# Patient Record
Sex: FEMALE | Race: WHITE | Employment: OTHER | URBAN - METROPOLITAN AREA
[De-identification: names, ages, dates, MRNs, and addresses within clinical notes are randomized per-mention and may not be internally consistent; named-entity substitution may affect disease eponyms.]

---

## 2017-03-06 ENCOUNTER — OFFICE VISIT (OUTPATIENT)
Dept: ORTHOPEDIC SURGERY | Age: 67
End: 2017-03-06

## 2017-03-06 VITALS
SYSTOLIC BLOOD PRESSURE: 172 MMHG | BODY MASS INDEX: 31.99 KG/M2 | WEIGHT: 192 LBS | DIASTOLIC BLOOD PRESSURE: 97 MMHG | HEIGHT: 65 IN

## 2017-03-06 DIAGNOSIS — M51.36 DDD (DEGENERATIVE DISC DISEASE), LUMBAR: ICD-10-CM

## 2017-03-06 DIAGNOSIS — M47.818 SI JOINT ARTHRITIS: ICD-10-CM

## 2017-03-06 DIAGNOSIS — M54.50 LUMBAR SPINE PAIN: Primary | ICD-10-CM

## 2017-03-06 DIAGNOSIS — M47.817 FACET ARTHRITIS OF LUMBOSACRAL REGION: ICD-10-CM

## 2017-03-06 PROCEDURE — 72100 X-RAY EXAM L-S SPINE 2/3 VWS: CPT | Performed by: PHYSICIAN ASSISTANT

## 2017-03-06 PROCEDURE — 99203 OFFICE O/P NEW LOW 30 MIN: CPT | Performed by: PHYSICIAN ASSISTANT

## 2017-03-06 RX ORDER — ATORVASTATIN CALCIUM 40 MG/1
TABLET, FILM COATED ORAL
Refills: 4 | COMMUNITY
Start: 2016-12-25

## 2017-03-06 RX ORDER — ATENOLOL 100 MG/1
TABLET ORAL
Refills: 1 | Status: ON HOLD | COMMUNITY
Start: 2016-12-27 | End: 2019-08-15 | Stop reason: HOSPADM

## 2017-03-06 RX ORDER — METHYLPREDNISOLONE 4 MG/1
TABLET ORAL
Qty: 1 KIT | Refills: 0 | Status: SHIPPED | OUTPATIENT
Start: 2017-03-06 | End: 2017-10-09

## 2017-03-06 RX ORDER — LOSARTAN POTASSIUM 50 MG/1
100 TABLET ORAL DAILY
Status: ON HOLD | COMMUNITY
Start: 2017-01-10 | End: 2019-08-15 | Stop reason: HOSPADM

## 2017-10-09 ENCOUNTER — OFFICE VISIT (OUTPATIENT)
Dept: ORTHOPEDIC SURGERY | Age: 67
End: 2017-10-09

## 2017-10-09 VITALS
SYSTOLIC BLOOD PRESSURE: 135 MMHG | BODY MASS INDEX: 31.65 KG/M2 | DIASTOLIC BLOOD PRESSURE: 82 MMHG | HEIGHT: 65 IN | HEART RATE: 90 BPM | WEIGHT: 190 LBS

## 2017-10-09 DIAGNOSIS — M25.552 LEFT HIP PAIN: Primary | ICD-10-CM

## 2017-10-09 DIAGNOSIS — S39.012A LUMBAR STRAIN, INITIAL ENCOUNTER: ICD-10-CM

## 2017-10-09 PROCEDURE — 73502 X-RAY EXAM HIP UNI 2-3 VIEWS: CPT | Performed by: ORTHOPAEDIC SURGERY

## 2017-10-09 PROCEDURE — 99214 OFFICE O/P EST MOD 30 MIN: CPT | Performed by: ORTHOPAEDIC SURGERY

## 2017-10-09 RX ORDER — METHYLPREDNISOLONE 4 MG/1
TABLET ORAL
Qty: 1 KIT | Refills: 0 | Status: SHIPPED | OUTPATIENT
Start: 2017-10-09 | End: 2019-01-08

## 2017-10-09 NOTE — PROGRESS NOTES
mouth every 8 hours as needed for Pain (Take with food), Disp: 120 tablet, Rfl: 0    lidocaine (LIDODERM) 5 %, Place 1 patch onto the skin daily 12 hours on, 12 hours off., Disp: 10 patch, Rfl: 0    atorvastatin (LIPITOR) 40 MG tablet, TAKE 1 TABLET BY MOUTH DAILY. , Disp: , Rfl: 4    atenolol (TENORMIN) 100 MG tablet, TAKE 1 TABLET BY MOUTH EVERY DAY, Disp: , Rfl: 1    losartan (COZAAR) 50 MG tablet, , Disp: , Rfl:   Allergies:  Sulphadimidine [sulfamethazine]  Social History:    reports that she has been smoking. She does not have any smokeless tobacco history on file. Family History:   Family History   Problem Relation Age of Onset    Osteoarthritis Mother     Cancer Paternal Grandmother        REVIEW OF SYSTEMS:   For new problems, a full review of systems will be found scanned in the patient's chart. CONSTITUTIONAL: Denies unexplained weight loss, fevers, chills   NEUROLOGICAL: Denies unsteady gait or progressive weakness  SKIN: Denies skin changes, delayed healing, rash, itching       PHYSICAL EXAM:    Vitals: Blood pressure 135/82, pulse 90, height 5' 5\" (1.651 m), weight 190 lb (86.2 kg). GENERAL EXAM:  · General Apparence: Patient is adequately groomed with no evidence of malnutrition. · Orientation: The patient is oriented to time, place and person. · Mood & Affect:The patient's mood and affect are appropriate       LEFT hip PHYSICAL EXAMINATION:  · Inspection:  No deformity ecchymosis or erythema    · Palpation:  There is tenderness in the LEFT SI joint and into the LEFT buttock over the area of the piriformis. No significant tenderness through the groin, no tenderness at the greater trochanter      · Range of Motion: Internal/external rotation of the bilateral hips is tolerated with no pain, negative straight leg raise bilaterally    · Strength: Hip flexor and abductor and adductor intact    · Special Tests:  Negative logroll testing bilateral hips.   The patient does have difficulty with balance on her LEFT lower extremity compared to the right secondary to pain            · Skin:  There are no rashes, ulcerations or lesions. · Gait & station:  favoring the LEFT leg      · Additional Examinations:        Right Lower Extremity: Examination of the right lower extremity does not show any tenderness, deformity or injury. Range of motion is unremarkable. There is no gross instability. There are no rashes, ulcerations or lesions. Strength and tone are normal.      Diagnostic Testing:      Views:  2   Location:  LEFT hip   Findings: Moderate to severe bilateral hip osteoarthritis but no evidence of acute abnormality    Orders     Orders Placed This Encounter   Procedures    XR HIP LEFT (2-3 VIEWS)     24087     Order Specific Question:   Reason for exam:     Answer:   Pain    [facility] PT [facility]     Referral Priority:   Routine     Referral Type:   Eval and Treat     Referral Reason:   Specialty Services Required     Requested Specialty:   Physical Therapy     Number of Visits Requested:   1         Assessment / Treatment Plan:     1. Bilateral hip osteoarthritis    2. LEFT lumbar strain with piriformis syndrome    At this time, although the patient has osteoarthritis of her hips, it does not seem to be symptomatic at this time. Her symptoms seem to be more lumbar in etiology. I recommended a Medrol Dosepak for symptomatic relief, activity modification at work and home exercises. I will also give her a referral to formal PT and she will check with her insurance and coverage. If her symptoms persist over the next 3-4 weeks, she will likely need to follow up with the spine team.      I have personally performed and/or participated in the history, exam and medical decision making and agree with all pertinent clinical information. I have also reviewed and agree with the past medical, family and social history unless otherwise noted.        This dictation was performed with a verbal recognition program (DRAGON) and it was checked for errors. It is possible that there are still dictated errors within this office note. If so, please bring any errors to my attention for an addendum. All efforts were made to ensure that this office note is accurate.           Pierre Zhang MD

## 2017-10-09 NOTE — LETTER
[place] [address]  Phone: [phone]  Fax: [phone]    Jody Rodriguez MD        October 9, 2017     Patient: Don Faustin   YOB: 1950   Date of Visit: 10/9/2017       To Whom It May Concern: It is my medical opinion that Mikie Malik should not participate in any heavy lifting or bending forward to lift any objects off the floor due to a recent orthopedic injury. These restrictions will be in effect for the next 2 weeks through 10/23/2017. If you have any questions or concerns, please don't hesitate to call.     Sincerely,        Jody Rodriguez MD

## 2017-10-20 ENCOUNTER — HOSPITAL ENCOUNTER (OUTPATIENT)
Dept: PHYSICAL THERAPY | Age: 67
Discharge: HOME | End: 2017-10-31
Admitting: ORTHOPAEDIC SURGERY

## 2017-10-30 ENCOUNTER — HOSPITAL ENCOUNTER (OUTPATIENT)
Dept: PHYSICAL THERAPY | Age: 67
Discharge: HOME | End: 2017-10-30
Admitting: ORTHOPAEDIC SURGERY

## 2017-10-30 NOTE — PLAN OF CARE
[place] [facility] [facility] [facility], [address]  Phone: [phone]  Fax [phone]    Physical Therapy Certification    Dear Referring Practitioner: Dr Allie Rodriguez,    We had the pleasure of evaluating the following patient for physical therapy services at [address] [place] [address]. A summary of our findings can be found in the initial assessment below. This includes our plan of care. If you have any questions or concerns regarding these findings, please do not hesitate to contact me at the office phone number checked above. Thank you for the referral.       Physician Signature:_______________________________Date:__________________  By signing above (or electronic signature), therapists plan is approved by physician    Patient: Wander Mckenna   : 1950   MRN: [id number]  Referring Physician: Referring Practitioner: Dr Allie Rodriguez      Evaluation Date: 10/30/2017      Medical Diagnosis Information:  Diagnosis: W47.183V (ICD-10-CM) - Lumbar strain, initial encounter/ Piriformis Syndrome   Treatment Diagnosis: Lumbar strain S39.012A                                         Insurance information: PT Insurance Information: MC/Humana     Precautions/ Contra-indications: DDD Lumbar spine, OA B knees, Anxiety/Depression  Latex Allergy:  [x]NO      []YES  Preferred Language for Healthcare:   [x]English       []other:    SUBJECTIVE: Patient stated complaint of of L buttock pain that has now improved to the point where she no longer has any pain. She is here to prevent the re-occurrence of pain. The original injury was  when she went to bend over and feed a dog. Since then she has been off work as a  on order to avoid bending and lifting. Relevant Medical History: Anxiety/Depression, OA B knees    Functional Disability Index/G-Codes:  PT G-Codes  Functional Assessment Tool Used:  Mod Oswestry  Score: 6%  Functional Limitation: Changing and maintaining body position  Changing and Maintaining Body Position Current Status (): At least 1 percent but less than 20 percent impaired, limited or restricted  Changing and Maintaining Body Position Goal Status (): At least 1 percent but less than 20 percent impaired, limited or restricted6%    Pain Scale:0/10  Easing factors: Cold  Provocative factors:lifting    Type: []Constant   []Intermittent  []Radiating []Localized [x]other:None currently   Numbness/Tingling: None    Occupation/School: [employer]/  Living Status/Prior Level of Function: Independent with ADLs and IADLs, Pt was able to work full time and was painfree    OBJECTIVE:     ROM     LUMBAR FLEX WNL    LUMBAR EXT 10    Sidebend WNL    Rotation WNL    STRENGTH LEFT RIGHT   HIP Flex 5 5   HIP Abd 5 5   HIP ER 5 5   HIP IR 5 5   Knee Flex 5 5   Knee ext 5 5   Hamstring FLEX - -   Piriformis  - +                                                                           Reflexes/Sensation:    [x]Dermatomes/Myotomes intact    []UE Reflexes     []Normal []Hypo      []Hyper   []LE Reflexes     []Normal []Hypo      []Hyper   []Babinski/Clonus/Hoffmans:    []Other:    Joint mobility: Lumbar   []Normal    [x]Hypo   []Hyper    Palpation:None today/ Pt reported R buttock    Functional Mobility/Transfers: Independent transfers  Posture: Decreased Lumbar Lordosis  Bandages/Dressings/Incisions: None  Gait: (include devices/WB status)     Orthopedic Special Tests: -SLR, - Slump Test                       [x] Patient history, allergies, meds reviewed. Medical chart reviewed. See intake form. Review Of Systems (ROS):  [x]Performed Review of systems (Integumentary, CardioPulmonary, Neurological) by intake and observation. Intake form has been scanned into medical record. Patient has been instructed to contact their primary care physician regarding ROS issues if not already being addressed at this time. Co-morbidities/Complexities (which will affect course of rehabilitation):   []None           Arthritic conditions   []Rheumatoid arthritis (M05.9)  []Osteoarthritis (M19.91)   Cardiovascular conditions   []Hypertension (I10)  []Hyperlipidemia (E78.5)  []Angina pectoris (I20)  []Atherosclerosis (I70)   Musculoskeletal conditions   []Disc pathology   []Congenital spine pathologies   []Prior surgical intervention  []Osteoporosis (M81.8)  []Osteopenia (M85.8)   Endocrine conditions   []Hypothyroid (E03.9)  []Hyperthyroid Gastrointestinal conditions   []Constipation (K13.91)   Metabolic conditions   []Morbid obesity (E66.01)  []Diabetes type 1(E10.65) or 2 (E11.65)   []Neuropathy (G60.9)     Pulmonary conditions   []Asthma (J45)  []Coughing   []COPD (J44.9)   Psychological Disorders  []Anxiety (F41.9)  []Depression (F32.9)   []Other:   [x]Other: Occupation         Barriers to/and or personal factors that will affect rehab potential:              []Age  []Sex              []Motivation/Lack of Motivation                        []Co-Morbidities              []Cognitive Function, education/learning barriers              []Environmental, home barriers              [x]profession/work barriers  []past PT/medical experience  []other:  Justification: Lifting    Falls Risk Assessment (30 days):   [x] Falls Risk assessed and no intervention required. [] Falls Risk assessed and Patient requires intervention due to being higher risk   TUG score (>12s at risk):     [] Falls education provided, including       G-Codes:  PT G-Codes  Functional Assessment Tool Used: Mod Oswestry  Score: 6%  Functional Limitation: Changing and maintaining body position  Changing and Maintaining Body Position Current Status (): At least 1 percent but less than 20 percent impaired, limited or restricted  Changing and Maintaining Body Position Goal Status ():  At least 1 percent but less than 20 percent impaired, limited or dysfunction   []Signs/symptoms consistent with lumbar stenosis type dysfunction   []Signs/symptoms consistent with nerve root involvement including myotome & dermatome dysfunction   []Signs/symptoms consistent with post-surgical status including: decreased ROM, strength and function. []signs/symptoms consistent with pathology which may benefit from Dry needling     []other:      Prognosis/Rehab Potential:      []Excellent   [x]Good    []Fair   []Poor    Tolerance of evaluation/treatment:    []Excellent   [x]Good    []Fair   []Poor  Physical Therapy Evaluation Complexity Justification  [x] A history of present problem with:  [] no personal factors and/or comorbidities that impact the plan of care;  [x]1-2 personal factors and/or comorbidities that impact the plan of care  []3 personal factors and/or comorbidities that impact the plan of care  [x] An examination of body systems using standardized tests and measures addressing any of the following: body structures and functions (impairments), activity limitations, and/or participation restrictions;:  [x] a total of 1-2 or more elements   [] a total of 3 or more elements   [] a total of 4 or more elements   [x] A clinical presentation with:  [x] stable and/or uncomplicated characteristics   [] evolving clinical presentation with changing characteristics  [] unstable and unpredictable characteristics;   [x] Clinical decision making of [x] low, [] moderate, [] high complexity using standardized patient assessment instrument and/or measurable assessment of functional outcome.     [x] EVAL (LOW) 68522 (typically 20 minutes face-to-face)  [] EVAL (MOD) 33074 (typically 30 minutes face-to-face)  [] EVAL (HIGH) 06724 (typically 45 minutes face-to-face)  [] RE-EVAL         PLAN: Begin PT focusing on: proximal hip mobilizations, LB mobs, LB core activation, proximal hip activation, and HEP    Frequency/Duration: 1-2 days per week for 4-6 Weeks:  Interventions:  [x]  Therapeutic exercise including: strength training, ROM, for LE, Glutes and core   [x]  NMR activation and proprioception for glutes , LE and Core   [x]  Manual therapy as indicated for Hip complex, LE and spine to include: Dry Needling/IASTM, STM, PROM, Gr I-IV mobilizations, manipulation. [x]  Modalities as needed that may include: thermal agents, E-stim, Biofeedback, US, iontophoresis as indicated  [x]  Patient education on joint protection, postural re-education, activity modification, progression of HEP. HEP instruction(see scanned forms)    GOALS:  Patient stated goal: Lifting from the floor, carrying heavy [place] for work and prevention of re-occurrence    Therapist goals for Patient:   Short Term Goals: To be achieved in: 2 weeks  1. Independent in HEP and progression per patient tolerance, in order to prevent re-injury. 2. Patient will have a decrease in pain to facilitate improvement in movement, function, and ADLs as indicated by Functional Deficits. Long Term Goals: To be achieved in: 4-6 weeks  1. Disability index score of 3% or less for the Mod Oswestry  to assist with reaching prior level of function. 2. Patient will demonstrate increased AROM to WNL, good LS mobility, good hip ROM to allow for proper joint functioning as indicated by patients Functional Deficits. 3. Patient will demonstrate an increase in proper body mechanics instruction and proper use of a Lumbar Roll  4. Patient will return to Lifting at work without exacerbating symptoms functional activities without increased symptoms or restriction.    5. Lifting properly from the floor and carrying heavy [place] on a tray without pain(patient specific functional goal)         Electronically signed by:  Juliette Hough PT

## 2017-10-31 NOTE — FLOWSHEET NOTE
[place] [facility] [facility],  [address]  Phone: [phone]  Fax [phone]    Physical Therapy Daily Treatment Note  Date:  10/30/2017    Patient Name:  Rylan Cavazos    :  1950  MRN: [id number]  Restrictions/Precautions:    Medical/Treatment Diagnosis Information:  · Diagnosis: U71.698F (ICD-10-CM) - Lumbar strain, initial encounter  · Treatment Diagnosis: Lumbar strain U80.000M  Insurance/Certification information:  PT Insurance Information: /Humana  Physician Information:  Referring Practitioner: Dr Karla Jacques of care signed (Y/N):     Date of Patient follow up with Physician:     G-Code (if applicable):      Date G-Code Applied:    PT G-Codes  Functional Assessment Tool Used: Mod Oswestry  Score: 6%  Functional Limitation: Changing and maintaining body position  Changing and Maintaining Body Position Current Status (): At least 1 percent but less than 20 percent impaired, limited or restricted  Changing and Maintaining Body Position Goal Status ():  At least 1 percent but less than 20 percent impaired, limited or restricted    Progress Note: []  Yes  [x]  No  Next due by: Visit #10      Latex Allergy:  [x]NO      []YES  Preferred Language for Healthcare:   [x]English       []other:    Visit # Insurance Allowable   1 [address] (not back yet)     Pain level:  0/10 Was 10/10 at the time of injury    SUBJECTIVE:  See eval    OBJECTIVE: See eval  Observation:   Test measurements:      RESTRICTIONS/PRECAUTIONS: Lumbar DDD/OA B knees/Anxiety/Depression    Exercises/Interventions:     Therapeutic Ex sets/reps comments   Prone on elbows 5 min HEP   Prone press ups  3 x 10 HEP   Supine piriformis S 20 sec x 5 HEP   Figure 4 piriformis S with opposite knee to chest 20 sec x 5 HEP                                                          Pt Ed regarding Diagnosis, HEP compliance, Bed Mob transfers and Lumbar roll Modalities:   CP L Piriformis x 10 min    Charges:  Timed Code Treatment Minutes: 25   Total Treatment Minutes: 55     [x] EVAL (LOW) 28239 (typically 20 minutes face-to-face)  [] EVAL (MOD) 48373 (typically 30 minutes face-to-face)  [] EVAL (HIGH) 93520 (typically 45 minutes face-to-face)  [] RE-EVAL     [x] JA(88971) x  2   [] IONTO  [] NMR (02893) x      [] VASO  [] Manual (41615) x       [] Other:  [] TA x       [] Mech Traction (87416)  [] ES(attended) (15435)      [] ES (un) (57685):     Goals: Patient stated goal: Lifting from the floor, carrying heavy [place] for work and prevention of re-occurrence    Therapist goals for Patient:   Short Term Goals: To be achieved in: 2 weeks  1. Independent in HEP and progression per patient tolerance, in order to prevent re-injury. 2. Patient will have a decrease in pain to facilitate improvement in movement, function, and ADLs as indicated by Functional Deficits. Long Term Goals: To be achieved in: 4-6 weeks  1. Disability index score of 3% or less for the Mod Oswestry  to assist with reaching prior level of function. 2. Patient will demonstrate increased AROM to WNL, good LS mobility, good hip ROM to allow for proper joint functioning as indicated by patients Functional Deficits. 3. Patient will demonstrate an increase in proper body mechanics instruction and proper use of a Lumbar Roll  4. Patient will return to Lifting at work without exacerbating symptoms functional activities without increased symptoms or restriction. 5. Lifting properly from the floor and carrying heavy [place] on a tray without pain(patient specific functional goal)   Progression Towards Functional goals:  [] Patient is progressing as expected towards functional goals listed. [] Progression is slowed due to complexities listed. [] Progression has been slowed due to co-morbidities.   [x] Plan just implemented, too soon to assess goals progression  [] Other:     ASSESSMENT:  See eval    Treatment/Activity Tolerance:  [x] Patient tolerated treatment well [] Patient limited by fatique  [] Patient limited by pain  [] Patient limited by other medical complications  [] Other:     Prognosis: [x] Good [] Fair  [] Poor    Patient Requires Follow-up: [x] Yes  [] No    PLAN: See eval  [] Continue per plan of care [] Alter current plan (see comments)  [x] Plan of care initiated [] Hold pending MD visit [] Discharge    Electronically signed by: Sherry Ignacio PT

## 2017-11-01 ENCOUNTER — HOSPITAL ENCOUNTER (OUTPATIENT)
Dept: PHYSICAL THERAPY | Age: 67
Discharge: HOME | End: 2017-11-30
Attending: ORTHOPAEDIC SURGERY | Admitting: ORTHOPAEDIC SURGERY

## 2019-01-08 RX ORDER — SPIRONOLACTONE AND HYDROCHLOROTHIAZIDE 25; 25 MG/1; MG/1
1 TABLET ORAL PRN
Status: ON HOLD | COMMUNITY
End: 2019-08-15 | Stop reason: HOSPADM

## 2019-01-08 RX ORDER — SERTRALINE HYDROCHLORIDE 25 MG/1
25 TABLET, FILM COATED ORAL DAILY
COMMUNITY
End: 2020-03-21 | Stop reason: ALTCHOICE

## 2019-01-08 RX ORDER — PANTOPRAZOLE SODIUM 40 MG/1
40 TABLET, DELAYED RELEASE ORAL DAILY
COMMUNITY

## 2019-01-10 ENCOUNTER — ANESTHESIA EVENT (OUTPATIENT)
Dept: OPERATING ROOM | Age: 69
End: 2019-01-10
Payer: MEDICARE

## 2019-01-11 ENCOUNTER — HOSPITAL ENCOUNTER (OUTPATIENT)
Age: 69
Setting detail: OUTPATIENT SURGERY
Discharge: HOME | End: 2019-01-11
Attending: OPHTHALMOLOGY | Admitting: OPHTHALMOLOGY
Payer: MEDICARE

## 2019-01-11 ENCOUNTER — ANESTHESIA (OUTPATIENT)
Dept: OPERATING ROOM | Age: 69
End: 2019-01-11
Payer: MEDICARE

## 2019-01-11 VITALS
RESPIRATION RATE: 16 BRPM | DIASTOLIC BLOOD PRESSURE: 59 MMHG | BODY MASS INDEX: 30.32 KG/M2 | SYSTOLIC BLOOD PRESSURE: 125 MMHG | WEIGHT: 182 LBS | HEART RATE: 76 BPM | TEMPERATURE: 98 F | HEIGHT: 65 IN | OXYGEN SATURATION: 97 %

## 2019-01-11 VITALS — OXYGEN SATURATION: 100 % | DIASTOLIC BLOOD PRESSURE: 76 MMHG | SYSTOLIC BLOOD PRESSURE: 150 MMHG

## 2019-01-11 PROCEDURE — 2500000003 HC RX 250 WO HCPCS: Performed by: OPHTHALMOLOGY

## 2019-01-11 PROCEDURE — 3600000003 HC SURGERY LEVEL 3 BASE: Performed by: OPHTHALMOLOGY

## 2019-01-11 PROCEDURE — 6360000002 HC RX W HCPCS: Performed by: OPHTHALMOLOGY

## 2019-01-11 PROCEDURE — 7100000011 HC PHASE II RECOVERY - ADDTL 15 MIN: Performed by: OPHTHALMOLOGY

## 2019-01-11 PROCEDURE — 6370000000 HC RX 637 (ALT 250 FOR IP): Performed by: OPHTHALMOLOGY

## 2019-01-11 PROCEDURE — 3700000000 HC ANESTHESIA ATTENDED CARE: Performed by: OPHTHALMOLOGY

## 2019-01-11 PROCEDURE — 2580000003 HC RX 258: Performed by: ANESTHESIOLOGY

## 2019-01-11 PROCEDURE — 6360000002 HC RX W HCPCS: Performed by: NURSE ANESTHETIST, CERTIFIED REGISTERED

## 2019-01-11 PROCEDURE — 2709999900 HC NON-CHARGEABLE SUPPLY: Performed by: OPHTHALMOLOGY

## 2019-01-11 PROCEDURE — 7100000010 HC PHASE II RECOVERY - FIRST 15 MIN: Performed by: OPHTHALMOLOGY

## 2019-01-11 PROCEDURE — 2580000003 HC RX 258: Performed by: OPHTHALMOLOGY

## 2019-01-11 PROCEDURE — 3600000013 HC SURGERY LEVEL 3 ADDTL 15MIN: Performed by: OPHTHALMOLOGY

## 2019-01-11 PROCEDURE — 6370000000 HC RX 637 (ALT 250 FOR IP): Performed by: ANESTHESIOLOGY

## 2019-01-11 PROCEDURE — V2632 POST CHMBR INTRAOCULAR LENS: HCPCS | Performed by: OPHTHALMOLOGY

## 2019-01-11 PROCEDURE — 3700000001 HC ADD 15 MINUTES (ANESTHESIA): Performed by: OPHTHALMOLOGY

## 2019-01-11 DEVICE — ACRYSOF(R) IQ ASPHERIC IOL SP ACRYLIC FOLDABLELENS WULTRASERT(TM) DELIVERY SYSTEM UV WBLUE LIGHT FILTER. 13.0MM LENGTH 6.0MM ANTERIORASYMMETRIC BICONVEX OPTIC PLANAR HAPTICS.
Type: IMPLANTABLE DEVICE | Status: FUNCTIONAL
Brand: ACRYSOF ULTRASERT

## 2019-01-11 RX ORDER — BENZONATATE 100 MG/1
100 CAPSULE ORAL ONCE
Status: COMPLETED | OUTPATIENT
Start: 2019-01-11 | End: 2019-01-11

## 2019-01-11 RX ORDER — BENZONATATE 100 MG/1
CAPSULE ORAL
Status: DISCONTINUED
Start: 2019-01-11 | End: 2019-01-11 | Stop reason: HOSPADM

## 2019-01-11 RX ORDER — SODIUM CHLORIDE, SODIUM LACTATE, POTASSIUM CHLORIDE, CALCIUM CHLORIDE 600; 310; 30; 20 MG/100ML; MG/100ML; MG/100ML; MG/100ML
INJECTION, SOLUTION INTRAVENOUS CONTINUOUS
Status: DISCONTINUED | OUTPATIENT
Start: 2019-01-11 | End: 2019-01-11 | Stop reason: HOSPADM

## 2019-01-11 RX ORDER — MOXIFLOXACIN 5 MG/ML
SOLUTION/ DROPS OPHTHALMIC PRN
Status: DISCONTINUED | OUTPATIENT
Start: 2019-01-11 | End: 2019-01-11 | Stop reason: HOSPADM

## 2019-01-11 RX ORDER — SODIUM CHLORIDE 0.9 % (FLUSH) 0.9 %
10 SYRINGE (ML) INJECTION PRN
Status: DISCONTINUED | OUTPATIENT
Start: 2019-01-11 | End: 2019-01-11 | Stop reason: HOSPADM

## 2019-01-11 RX ORDER — SODIUM CHLORIDE 0.9 % (FLUSH) 0.9 %
10 SYRINGE (ML) INJECTION EVERY 12 HOURS SCHEDULED
Status: DISCONTINUED | OUTPATIENT
Start: 2019-01-11 | End: 2019-01-11 | Stop reason: HOSPADM

## 2019-01-11 RX ORDER — MIDAZOLAM HYDROCHLORIDE 1 MG/ML
INJECTION INTRAMUSCULAR; INTRAVENOUS PRN
Status: DISCONTINUED | OUTPATIENT
Start: 2019-01-11 | End: 2019-01-11 | Stop reason: SDUPTHER

## 2019-01-11 RX ORDER — LIDOCAINE HYDROCHLORIDE 10 MG/ML
1 INJECTION, SOLUTION EPIDURAL; INFILTRATION; INTRACAUDAL; PERINEURAL
Status: DISCONTINUED | OUTPATIENT
Start: 2019-01-11 | End: 2019-01-11 | Stop reason: HOSPADM

## 2019-01-11 RX ORDER — MAGNESIUM HYDROXIDE 1200 MG/15ML
LIQUID ORAL PRN
Status: DISCONTINUED | OUTPATIENT
Start: 2019-01-11 | End: 2019-01-11 | Stop reason: HOSPADM

## 2019-01-11 RX ORDER — FENTANYL CITRATE 50 UG/ML
INJECTION, SOLUTION INTRAMUSCULAR; INTRAVENOUS PRN
Status: DISCONTINUED | OUTPATIENT
Start: 2019-01-11 | End: 2019-01-11 | Stop reason: SDUPTHER

## 2019-01-11 RX ORDER — BRIMONIDINE TARTRATE 2 MG/ML
SOLUTION/ DROPS OPHTHALMIC PRN
Status: DISCONTINUED | OUTPATIENT
Start: 2019-01-11 | End: 2019-01-11 | Stop reason: HOSPADM

## 2019-01-11 RX ADMIN — Medication 0.3 ML: at 08:55

## 2019-01-11 RX ADMIN — MIDAZOLAM HYDROCHLORIDE 1 MG: 2 INJECTION, SOLUTION INTRAMUSCULAR; INTRAVENOUS at 10:38

## 2019-01-11 RX ADMIN — Medication 0.3 ML: at 08:36

## 2019-01-11 RX ADMIN — Medication 0.3 ML: at 08:46

## 2019-01-11 RX ADMIN — SODIUM CHLORIDE, POTASSIUM CHLORIDE, SODIUM LACTATE AND CALCIUM CHLORIDE: 600; 310; 30; 20 INJECTION, SOLUTION INTRAVENOUS at 08:44

## 2019-01-11 RX ADMIN — FENTANYL CITRATE 25 MCG: 50 INJECTION INTRAMUSCULAR; INTRAVENOUS at 10:47

## 2019-01-11 RX ADMIN — SODIUM CHLORIDE, POTASSIUM CHLORIDE, SODIUM LACTATE AND CALCIUM CHLORIDE: 600; 310; 30; 20 INJECTION, SOLUTION INTRAVENOUS at 10:39

## 2019-01-11 RX ADMIN — FENTANYL CITRATE 25 MCG: 50 INJECTION INTRAMUSCULAR; INTRAVENOUS at 10:38

## 2019-01-11 RX ADMIN — BENZONATATE 100 MG: 100 CAPSULE ORAL at 08:49

## 2019-01-11 RX ADMIN — MIDAZOLAM HYDROCHLORIDE 1 MG: 2 INJECTION, SOLUTION INTRAMUSCULAR; INTRAVENOUS at 10:46

## 2019-01-11 ASSESSMENT — PULMONARY FUNCTION TESTS
PIF_VALUE: 0
PIF_VALUE: 1
PIF_VALUE: 0

## 2019-01-11 ASSESSMENT — PAIN SCALES - GENERAL: PAINLEVEL_OUTOF10: 0

## 2019-01-11 ASSESSMENT — COPD QUESTIONNAIRES: CAT_SEVERITY: MILD

## 2019-01-11 ASSESSMENT — LIFESTYLE VARIABLES: SMOKING_STATUS: 1

## 2019-08-11 ENCOUNTER — HOSPITAL ENCOUNTER (INPATIENT)
Age: 69
LOS: 3 days | Discharge: HOME | DRG: 378 | End: 2019-08-15
Attending: EMERGENCY MEDICINE | Admitting: INTERNAL MEDICINE
Payer: MEDICARE

## 2019-08-11 DIAGNOSIS — I10 ESSENTIAL HYPERTENSION: ICD-10-CM

## 2019-08-11 DIAGNOSIS — R42 LIGHTHEADEDNESS: ICD-10-CM

## 2019-08-11 DIAGNOSIS — K29.01 GASTROINTESTINAL HEMORRHAGE ASSOCIATED WITH ACUTE GASTRITIS: ICD-10-CM

## 2019-08-11 DIAGNOSIS — D64.9 ANEMIA, UNSPECIFIED TYPE: Primary | ICD-10-CM

## 2019-08-11 DIAGNOSIS — K92.2 GASTROINTESTINAL HEMORRHAGE, UNSPECIFIED GASTROINTESTINAL HEMORRHAGE TYPE: ICD-10-CM

## 2019-08-11 LAB
A/G RATIO: 1.3 (ref 1.1–2.2)
ALBUMIN SERPL-MCNC: 3.5 G/DL (ref 3.4–5)
ALP BLD-CCNC: 74 U/L (ref 40–129)
ALT SERPL-CCNC: 27 U/L (ref 10–40)
ANION GAP SERPL CALCULATED.3IONS-SCNC: 18 MMOL/L (ref 3–16)
AST SERPL-CCNC: 41 U/L (ref 15–37)
BILIRUB SERPL-MCNC: 0.4 MG/DL (ref 0–1)
BUN BLDV-MCNC: 16 MG/DL (ref 7–20)
CALCIUM SERPL-MCNC: 9 MG/DL (ref 8.3–10.6)
CHLORIDE BLD-SCNC: 96 MMOL/L (ref 99–110)
CO2: 19 MMOL/L (ref 21–32)
CREAT SERPL-MCNC: 0.9 MG/DL (ref 0.6–1.2)
GFR AFRICAN AMERICAN: >60
GFR NON-AFRICAN AMERICAN: >60
GLOBULIN: 2.6 G/DL
GLUCOSE BLD-MCNC: 106 MG/DL (ref 70–99)
INR BLD: 0.92 (ref 0.86–1.14)
OCCULT BLOOD SCREENING: ABNORMAL
POTASSIUM SERPL-SCNC: 4 MMOL/L (ref 3.5–5.1)
PROTHROMBIN TIME: 10.5 SEC (ref 9.8–13)
SODIUM BLD-SCNC: 133 MMOL/L (ref 136–145)
TOTAL PROTEIN: 6.1 G/DL (ref 6.4–8.2)
TROPONIN: <0.01 NG/ML

## 2019-08-11 PROCEDURE — 84484 ASSAY OF TROPONIN QUANT: CPT

## 2019-08-11 PROCEDURE — 36415 COLL VENOUS BLD VENIPUNCTURE: CPT

## 2019-08-11 PROCEDURE — 93005 ELECTROCARDIOGRAM TRACING: CPT | Performed by: EMERGENCY MEDICINE

## 2019-08-11 PROCEDURE — 85610 PROTHROMBIN TIME: CPT

## 2019-08-11 PROCEDURE — 86850 RBC ANTIBODY SCREEN: CPT

## 2019-08-11 PROCEDURE — 99285 EMERGENCY DEPT VISIT HI MDM: CPT

## 2019-08-11 PROCEDURE — 86900 BLOOD TYPING SEROLOGIC ABO: CPT

## 2019-08-11 PROCEDURE — 80053 COMPREHEN METABOLIC PANEL: CPT

## 2019-08-11 PROCEDURE — P9016 RBC LEUKOCYTES REDUCED: HCPCS

## 2019-08-11 PROCEDURE — G0328 FECAL BLOOD SCRN IMMUNOASSAY: HCPCS

## 2019-08-11 PROCEDURE — 86901 BLOOD TYPING SEROLOGIC RH(D): CPT

## 2019-08-11 PROCEDURE — 36430 TRANSFUSION BLD/BLD COMPNT: CPT

## 2019-08-11 PROCEDURE — 86923 COMPATIBILITY TEST ELECTRIC: CPT

## 2019-08-11 PROCEDURE — 85025 COMPLETE CBC W/AUTO DIFF WBC: CPT

## 2019-08-11 RX ORDER — 0.9 % SODIUM CHLORIDE 0.9 %
250 INTRAVENOUS SOLUTION INTRAVENOUS ONCE
Status: COMPLETED | OUTPATIENT
Start: 2019-08-11 | End: 2019-08-12

## 2019-08-11 RX ORDER — PANTOPRAZOLE SODIUM 40 MG/10ML
40 INJECTION, POWDER, LYOPHILIZED, FOR SOLUTION INTRAVENOUS ONCE
Status: COMPLETED | OUTPATIENT
Start: 2019-08-11 | End: 2019-08-12

## 2019-08-11 ASSESSMENT — ENCOUNTER SYMPTOMS
EYES NEGATIVE: 1
SHORTNESS OF BREATH: 1
BLOOD IN STOOL: 1
ABDOMINAL PAIN: 0
COUGH: 0

## 2019-08-12 PROBLEM — K92.2 GI BLEED: Status: ACTIVE | Noted: 2019-08-12

## 2019-08-12 PROBLEM — I10 HIGH BLOOD PRESSURE: Status: ACTIVE | Noted: 2019-08-12

## 2019-08-12 PROBLEM — Z72.0 TOBACCO USE: Status: ACTIVE | Noted: 2019-08-12

## 2019-08-12 PROBLEM — E66.9 OBESITY: Status: ACTIVE | Noted: 2019-08-12

## 2019-08-12 PROBLEM — E78.5 HYPERLIPIDEMIA: Status: ACTIVE | Noted: 2019-08-12

## 2019-08-12 LAB
ABO/RH: NORMAL
ANION GAP SERPL CALCULATED.3IONS-SCNC: 15 MMOL/L (ref 3–16)
ANTIBODY SCREEN: NORMAL
BACTERIA: ABNORMAL /HPF
BILIRUBIN URINE: ABNORMAL
BLOOD BANK DISPENSE STATUS: NORMAL
BLOOD BANK PRODUCT CODE: NORMAL
BLOOD, URINE: NEGATIVE
BPU ID: NORMAL
BUN BLDV-MCNC: 15 MG/DL (ref 7–20)
CALCIUM SERPL-MCNC: 8.5 MG/DL (ref 8.3–10.6)
CHLORIDE BLD-SCNC: 97 MMOL/L (ref 99–110)
CLARITY: CLEAR
CO2: 20 MMOL/L (ref 21–32)
COLOR: YELLOW
CREAT SERPL-MCNC: 0.7 MG/DL (ref 0.6–1.2)
DESCRIPTION BLOOD BANK: NORMAL
EPITHELIAL CELLS, UA: ABNORMAL /HPF
GFR AFRICAN AMERICAN: >60
GFR NON-AFRICAN AMERICAN: >60
GLUCOSE BLD-MCNC: 93 MG/DL (ref 70–99)
GLUCOSE URINE: NEGATIVE MG/DL
KETONES, URINE: 40 MG/DL
LEUKOCYTE ESTERASE, URINE: ABNORMAL
MICROSCOPIC EXAMINATION: YES
MUCUS: ABNORMAL /LPF
NITRITE, URINE: NEGATIVE
PH UA: 6 (ref 5–8)
POTASSIUM REFLEX MAGNESIUM: 3.9 MMOL/L (ref 3.5–5.1)
PROTEIN UA: NEGATIVE MG/DL
RBC UA: ABNORMAL /HPF (ref 0–2)
SODIUM BLD-SCNC: 132 MMOL/L (ref 136–145)
SPECIFIC GRAVITY UA: 1.01 (ref 1–1.03)
URINE TYPE: ABNORMAL
UROBILINOGEN, URINE: 0.2 E.U./DL
WBC UA: ABNORMAL /HPF (ref 0–5)

## 2019-08-12 PROCEDURE — 6370000000 HC RX 637 (ALT 250 FOR IP): Performed by: NURSE PRACTITIONER

## 2019-08-12 PROCEDURE — 2580000003 HC RX 258: Performed by: NURSE PRACTITIONER

## 2019-08-12 PROCEDURE — C9113 INJ PANTOPRAZOLE SODIUM, VIA: HCPCS | Performed by: NURSE PRACTITIONER

## 2019-08-12 PROCEDURE — C9113 INJ PANTOPRAZOLE SODIUM, VIA: HCPCS | Performed by: PHYSICIAN ASSISTANT

## 2019-08-12 PROCEDURE — 96374 THER/PROPH/DIAG INJ IV PUSH: CPT

## 2019-08-12 PROCEDURE — 6370000000 HC RX 637 (ALT 250 FOR IP)

## 2019-08-12 PROCEDURE — 80048 BASIC METABOLIC PNL TOTAL CA: CPT

## 2019-08-12 PROCEDURE — 1200000000 HC SEMI PRIVATE

## 2019-08-12 PROCEDURE — 6360000002 HC RX W HCPCS: Performed by: PHYSICIAN ASSISTANT

## 2019-08-12 PROCEDURE — 2580000003 HC RX 258: Performed by: PHYSICIAN ASSISTANT

## 2019-08-12 PROCEDURE — 36415 COLL VENOUS BLD VENIPUNCTURE: CPT

## 2019-08-12 PROCEDURE — 81001 URINALYSIS AUTO W/SCOPE: CPT

## 2019-08-12 PROCEDURE — 6360000002 HC RX W HCPCS: Performed by: NURSE PRACTITIONER

## 2019-08-12 RX ORDER — LOSARTAN POTASSIUM 100 MG/1
100 TABLET ORAL DAILY
Status: DISCONTINUED | OUTPATIENT
Start: 2019-08-12 | End: 2019-08-15 | Stop reason: HOSPADM

## 2019-08-12 RX ORDER — ATENOLOL 25 MG/1
100 TABLET ORAL DAILY
Status: DISCONTINUED | OUTPATIENT
Start: 2019-08-12 | End: 2019-08-15 | Stop reason: HOSPADM

## 2019-08-12 RX ORDER — ATORVASTATIN CALCIUM 40 MG/1
40 TABLET, FILM COATED ORAL DAILY
Status: DISCONTINUED | OUTPATIENT
Start: 2019-08-12 | End: 2019-08-15 | Stop reason: HOSPADM

## 2019-08-12 RX ORDER — SODIUM CHLORIDE 9 MG/ML
INJECTION, SOLUTION INTRAVENOUS CONTINUOUS
Status: DISCONTINUED | OUTPATIENT
Start: 2019-08-12 | End: 2019-08-15 | Stop reason: HOSPADM

## 2019-08-12 RX ORDER — ACETAMINOPHEN 325 MG/1
650 TABLET ORAL EVERY 4 HOURS PRN
Status: DISCONTINUED | OUTPATIENT
Start: 2019-08-12 | End: 2019-08-15 | Stop reason: HOSPADM

## 2019-08-12 RX ORDER — SODIUM CHLORIDE 0.9 % (FLUSH) 0.9 %
10 SYRINGE (ML) INJECTION EVERY 12 HOURS SCHEDULED
Status: DISCONTINUED | OUTPATIENT
Start: 2019-08-12 | End: 2019-08-15 | Stop reason: HOSPADM

## 2019-08-12 RX ORDER — ONDANSETRON 2 MG/ML
4 INJECTION INTRAMUSCULAR; INTRAVENOUS EVERY 6 HOURS PRN
Status: DISCONTINUED | OUTPATIENT
Start: 2019-08-12 | End: 2019-08-15 | Stop reason: HOSPADM

## 2019-08-12 RX ORDER — 0.9 % SODIUM CHLORIDE 0.9 %
10 VIAL (ML) INJECTION EVERY 12 HOURS
Status: DISCONTINUED | OUTPATIENT
Start: 2019-08-12 | End: 2019-08-15 | Stop reason: HOSPADM

## 2019-08-12 RX ORDER — SODIUM CHLORIDE 0.9 % (FLUSH) 0.9 %
10 SYRINGE (ML) INJECTION PRN
Status: DISCONTINUED | OUTPATIENT
Start: 2019-08-12 | End: 2019-08-15 | Stop reason: HOSPADM

## 2019-08-12 RX ORDER — PANTOPRAZOLE SODIUM 40 MG/10ML
40 INJECTION, POWDER, LYOPHILIZED, FOR SOLUTION INTRAVENOUS EVERY 12 HOURS
Status: DISCONTINUED | OUTPATIENT
Start: 2019-08-12 | End: 2019-08-15 | Stop reason: HOSPADM

## 2019-08-12 RX ADMIN — SODIUM CHLORIDE: 9 INJECTION, SOLUTION INTRAVENOUS at 23:26

## 2019-08-12 RX ADMIN — ACETAMINOPHEN 650 MG: 325 TABLET ORAL at 23:30

## 2019-08-12 RX ADMIN — SODIUM CHLORIDE 250 ML: 9 INJECTION, SOLUTION INTRAVENOUS at 02:02

## 2019-08-12 RX ADMIN — Medication 10 ML: at 16:50

## 2019-08-12 RX ADMIN — SODIUM CHLORIDE, PRESERVATIVE FREE 10 ML: 5 INJECTION INTRAVENOUS at 23:20

## 2019-08-12 RX ADMIN — PANTOPRAZOLE SODIUM 40 MG: 40 INJECTION, POWDER, FOR SOLUTION INTRAVENOUS at 00:05

## 2019-08-12 RX ADMIN — SODIUM CHLORIDE: 9 INJECTION, SOLUTION INTRAVENOUS at 02:50

## 2019-08-12 RX ADMIN — PANTOPRAZOLE SODIUM 40 MG: 40 INJECTION, POWDER, FOR SOLUTION INTRAVENOUS at 23:20

## 2019-08-12 RX ADMIN — PANTOPRAZOLE SODIUM 40 MG: 40 INJECTION, POWDER, FOR SOLUTION INTRAVENOUS at 11:42

## 2019-08-12 RX ADMIN — ATORVASTATIN CALCIUM 40 MG: 40 TABLET, FILM COATED ORAL at 11:47

## 2019-08-12 RX ADMIN — SODIUM CHLORIDE: 9 INJECTION, SOLUTION INTRAVENOUS at 02:17

## 2019-08-12 RX ADMIN — Medication 10 ML: at 02:16

## 2019-08-12 RX ADMIN — Medication: at 23:24

## 2019-08-12 ASSESSMENT — PAIN SCALES - GENERAL
PAINLEVEL_OUTOF10: 2
PAINLEVEL_OUTOF10: 0
PAINLEVEL_OUTOF10: 2
PAINLEVEL_OUTOF10: 0

## 2019-08-12 ASSESSMENT — PAIN DESCRIPTION - PAIN TYPE: TYPE: ACUTE PAIN

## 2019-08-12 ASSESSMENT — PAIN DESCRIPTION - LOCATION: LOCATION: ABDOMEN

## 2019-08-12 NOTE — H&P
ED  -protonix given in ED and continued  -clear liquids  -nursing to document all stool for color and consistency  -GI consult - appreciate recommendations in advance  -every 6 hour H/H  -fibrinogen level  -MIVF  -type and screen  -tele monitoring    Essential HTN  -continue atenolol and losartan    HLD, stable  -continue atorvastatin    Obesity  With Body mass index is 30.8 kg/m². Complicating assessment and treatment. Placing patient at risk for multiple co-morbidities as well as early death and contributing to the patient's presentation. Counseled on weight loss    Tobacco use  -tobacco cessation  -nicotine patch if needed    DVT Prophylaxis: SCD    Diet: No diet orders on file     Code Status: No Order    PT/OT Eval Status: No indication for need at this time    Dispo - 2-3 days pending clinical improvement     Cristina Hankins, APRN - CNP    Thank you Meghan Callahan MD for the opportunity to be involved in this patient's care. If you have any questions or concerns please feel free to contact me at [phone].     Addendum to NP  H& P note:  Pt seen,examined and evaluated this AM  . I have reviewed the current history, physical findings, labs and assessment and plan and agree with note as documented by Overnight NP Kristel Minor )     Sepideh Hernandez MD  Hospitalist Physician

## 2019-08-13 ENCOUNTER — ANESTHESIA EVENT (OUTPATIENT)
Dept: INPATIENT UNIT | Age: 69
DRG: 378 | End: 2019-08-13
Payer: MEDICARE

## 2019-08-13 ENCOUNTER — ANESTHESIA (OUTPATIENT)
Dept: INPATIENT UNIT | Age: 69
DRG: 378 | End: 2019-08-13
Payer: MEDICARE

## 2019-08-13 LAB
ANION GAP SERPL CALCULATED.3IONS-SCNC: 11 MMOL/L (ref 3–16)
BASOPHILS ABSOLUTE: 0.1 K/UL (ref 0–0.2)
BASOPHILS RELATIVE PERCENT: 0.7 %
BUN BLDV-MCNC: 8 MG/DL (ref 7–20)
CALCIUM SERPL-MCNC: 8.5 MG/DL (ref 8.3–10.6)
CHLORIDE BLD-SCNC: 104 MMOL/L (ref 99–110)
CO2: 22 MMOL/L (ref 21–32)
CREAT SERPL-MCNC: 0.6 MG/DL (ref 0.6–1.2)
EKG ATRIAL RATE: 102 BPM
EKG DIAGNOSIS: NORMAL
EKG P AXIS: 47 DEGREES
EKG P-R INTERVAL: 150 MS
EKG Q-T INTERVAL: 354 MS
EKG QRS DURATION: 96 MS
EKG QTC CALCULATION (BAZETT): 461 MS
EKG R AXIS: 40 DEGREES
EKG T AXIS: 26 DEGREES
EKG VENTRICULAR RATE: 102 BPM
EOSINOPHILS ABSOLUTE: 0 K/UL (ref 0–0.6)
EOSINOPHILS RELATIVE PERCENT: 0.3 %
FIBRINOGEN: 240 MG/DL (ref 200–397)
GFR AFRICAN AMERICAN: >60
GFR NON-AFRICAN AMERICAN: >60
GLUCOSE BLD-MCNC: 93 MG/DL (ref 70–99)
HCT VFR BLD CALC: 23.3 % (ref 36–48)
HCT VFR BLD CALC: 25.6 % (ref 36–48)
HEMATOLOGY PATH CONSULT: NORMAL
HEMATOLOGY PATH CONSULT: YES
HEMOGLOBIN: 7.9 G/DL (ref 12–16)
HEMOGLOBIN: 8.6 G/DL (ref 12–16)
LYMPHOCYTES ABSOLUTE: 2.1 K/UL (ref 1–5.1)
LYMPHOCYTES RELATIVE PERCENT: 21.2 %
MCH RBC QN AUTO: 40.3 PG (ref 26–34)
MCHC RBC AUTO-ENTMCNC: 34 G/DL (ref 31–36)
MCV RBC AUTO: 118.6 FL (ref 80–100)
MONOCYTES ABSOLUTE: 0.7 K/UL (ref 0–1.3)
MONOCYTES RELATIVE PERCENT: 6.9 %
NEUTROPHILS ABSOLUTE: 7.1 K/UL (ref 1.7–7.7)
NEUTROPHILS RELATIVE PERCENT: 70.9 %
PDW BLD-RTO: 16.1 % (ref 12.4–15.4)
PLATELET # BLD: 226 K/UL (ref 135–450)
PMV BLD AUTO: 7.8 FL (ref 5–10.5)
POTASSIUM REFLEX MAGNESIUM: 3.8 MMOL/L (ref 3.5–5.1)
RBC # BLD: 1.96 M/UL (ref 4–5.2)
SODIUM BLD-SCNC: 137 MMOL/L (ref 136–145)
WBC # BLD: 10 K/UL (ref 4–11)

## 2019-08-13 PROCEDURE — 93010 ELECTROCARDIOGRAM REPORT: CPT | Performed by: INTERNAL MEDICINE

## 2019-08-13 PROCEDURE — 1200000000 HC SEMI PRIVATE

## 2019-08-13 PROCEDURE — 0DB68ZX EXCISION OF STOMACH, VIA NATURAL OR ARTIFICIAL OPENING ENDOSCOPIC, DIAGNOSTIC: ICD-10-PCS | Performed by: INTERNAL MEDICINE

## 2019-08-13 PROCEDURE — 85384 FIBRINOGEN ACTIVITY: CPT

## 2019-08-13 PROCEDURE — 6370000000 HC RX 637 (ALT 250 FOR IP): Performed by: NURSE PRACTITIONER

## 2019-08-13 PROCEDURE — 36415 COLL VENOUS BLD VENIPUNCTURE: CPT

## 2019-08-13 PROCEDURE — 2580000003 HC RX 258: Performed by: NURSE PRACTITIONER

## 2019-08-13 PROCEDURE — 99152 MOD SED SAME PHYS/QHP 5/>YRS: CPT | Performed by: INTERNAL MEDICINE

## 2019-08-13 PROCEDURE — 85014 HEMATOCRIT: CPT

## 2019-08-13 PROCEDURE — 2709999900 HC NON-CHARGEABLE SUPPLY: Performed by: INTERNAL MEDICINE

## 2019-08-13 PROCEDURE — 3609012400 HC EGD TRANSORAL BIOPSY SINGLE/MULTIPLE: Performed by: INTERNAL MEDICINE

## 2019-08-13 PROCEDURE — 6360000002 HC RX W HCPCS: Performed by: NURSE PRACTITIONER

## 2019-08-13 PROCEDURE — 80048 BASIC METABOLIC PNL TOTAL CA: CPT

## 2019-08-13 PROCEDURE — 99153 MOD SED SAME PHYS/QHP EA: CPT | Performed by: INTERNAL MEDICINE

## 2019-08-13 PROCEDURE — 85018 HEMOGLOBIN: CPT

## 2019-08-13 PROCEDURE — C9113 INJ PANTOPRAZOLE SODIUM, VIA: HCPCS | Performed by: NURSE PRACTITIONER

## 2019-08-13 PROCEDURE — 0DB98ZX EXCISION OF DUODENUM, VIA NATURAL OR ARTIFICIAL OPENING ENDOSCOPIC, DIAGNOSTIC: ICD-10-PCS | Performed by: INTERNAL MEDICINE

## 2019-08-13 PROCEDURE — 88305 TISSUE EXAM BY PATHOLOGIST: CPT

## 2019-08-13 PROCEDURE — 7100000011 HC PHASE II RECOVERY - ADDTL 15 MIN: Performed by: INTERNAL MEDICINE

## 2019-08-13 PROCEDURE — 6360000002 HC RX W HCPCS: Performed by: INTERNAL MEDICINE

## 2019-08-13 PROCEDURE — 7100000010 HC PHASE II RECOVERY - FIRST 15 MIN: Performed by: INTERNAL MEDICINE

## 2019-08-13 RX ORDER — FENTANYL CITRATE 50 UG/ML
INJECTION, SOLUTION INTRAMUSCULAR; INTRAVENOUS PRN
Status: DISCONTINUED | OUTPATIENT
Start: 2019-08-13 | End: 2019-08-13 | Stop reason: ALTCHOICE

## 2019-08-13 RX ORDER — MIDAZOLAM HYDROCHLORIDE 5 MG/ML
INJECTION INTRAMUSCULAR; INTRAVENOUS PRN
Status: DISCONTINUED | OUTPATIENT
Start: 2019-08-13 | End: 2019-08-13 | Stop reason: ALTCHOICE

## 2019-08-13 RX ADMIN — ATENOLOL 100 MG: 25 TABLET ORAL at 18:02

## 2019-08-13 RX ADMIN — ATORVASTATIN CALCIUM 40 MG: 40 TABLET, FILM COATED ORAL at 18:02

## 2019-08-13 RX ADMIN — Medication 10 ML: at 18:02

## 2019-08-13 RX ADMIN — SODIUM CHLORIDE: 9 INJECTION, SOLUTION INTRAVENOUS at 14:08

## 2019-08-13 RX ADMIN — LOSARTAN POTASSIUM 100 MG: 100 TABLET, FILM COATED ORAL at 18:02

## 2019-08-13 RX ADMIN — PANTOPRAZOLE SODIUM 40 MG: 40 INJECTION, POWDER, FOR SOLUTION INTRAVENOUS at 18:01

## 2019-08-13 ASSESSMENT — PAIN SCALES - GENERAL
PAINLEVEL_OUTOF10: 0

## 2019-08-13 ASSESSMENT — PAIN - FUNCTIONAL ASSESSMENT: PAIN_FUNCTIONAL_ASSESSMENT: 0-10

## 2019-08-13 NOTE — PROGRESS NOTES
Arrived per cart from floor in no acute distress. IV infusing per pump at 150cc/hr into right wrist without redness or edema. Resp. Non-labored and skin warm and dry. Offers no complaints.

## 2019-08-13 NOTE — FLOWSHEET NOTE
08/12/19 2005   Assessment   Charting Type Shift assessment   Neurological   Neuro (WDL) WDL   Level of Consciousness 0   [place] Coma Scale   Eye Opening 4   Best Verbal Response 5   Best Motor Response 6   [place] Coma Scale Score 15   HEENT   HEENT (WDL) WDL   Respiratory   Respiratory (WDL) X   Respiratory Pattern Regular   Respiratory Depth Normal   Respiratory Quality/Effort Unlabored;Dyspnea with exertion   Chest Assessment Chest expansion symmetrical;Trachea midline   L Breath Sounds Diminished   R Breath Sounds Diminished   Cough/Sputum   Cough Dry   Frequency Occasional   Cardiac   Cardiac (WDL) WDL   Rhythm Interpretation   Pulse 85   Gastrointestinal   Abdominal (WDL) WDL   Peripheral Vascular   Peripheral Vascular (WDL) WDL   Edema None   Skin Color/Condition   Skin Color/Condition (WDL) WDL   Skin Color Appropriate for ethnicity   Skin Condition/Temp Warm   Skin Integrity   Skin Integrity (WDL) WDL   Musculoskeletal   Musculoskeletal (WDL) WDL   Genitourinary   Genitourinary (WDL) WDL   Flank Tenderness No   Suprapubic Tenderness No   Dysuria No   Anus/Rectum   Anus/Rectum (WDL) WDL   Psychosocial   Psychosocial (WDL) WDL

## 2019-08-13 NOTE — PROGRESS NOTES
Hospitalist Progress Note      PCP: Vaughn Santiago MD    Date of Admission: 8/11/2019    Chief Complaint:  Weak and dizzy    Hospital Course: Reviewed H&P     Subjective: Patient seen and examined this morning. Denies any dizziness, lightheadedness. GI evaluated with plans to do EGD at 1:30 PM today. Patient denies any new complaints at this time. Medications:  Reviewed    Infusion Medications    sodium chloride 150 mL/hr at 08/13/19 1408     Scheduled Medications    atenolol  100 mg Oral Daily    atorvastatin  40 mg Oral Daily    losartan  100 mg Oral Daily    sodium chloride flush  10 mL Intravenous 2 times per day    pantoprazole  40 mg Intravenous Q12H    And    sodium chloride (PF)  10 mL Intravenous Q12H     PRN Meds: sodium chloride flush, acetaminophen, ondansetron      Intake/Output Summary (Last 24 hours) at 8/13/2019 1632  Last data filed at 8/13/2019 0618  Gross per 24 hour   Intake 0 ml   Output 300 ml   Net -300 ml       Physical Exam Performed:    /66   Pulse 81   Temp 97.6 °F (36.4 °C) (Oral)   Resp 16   Ht 5' 5\" (1.651 m)   Wt 181 lb 8 oz (82.3 kg)   SpO2 100%   BMI 30.20 kg/m²     General appearance: No apparent distress, appears stated age and cooperative. HEENT: Pupils equal, round, and reactive to light. Conjunctivae/corneas clear. Neck: Supple, with full range of motion. No jugular venous distention. Trachea midline. Respiratory:  Normal respiratory effort. Clear to auscultation, bilaterally without Rales/Wheezes/Rhonchi. Cardiovascular: Regular rate and rhythm with normal S1/S2 without murmurs, rubs or gallops. Abdomen: Soft, non-tender, non-distended with normal bowel sounds. Musculoskeletal: No clubbing, cyanosis or edema bilaterally. Full range of motion without deformity. Skin: Skin color, texture, turgor normal.  No rashes or lesions. Neurologic:  Neurovascularly intact without any focal sensory/motor deficits.  Cranial nerves: II-XII intact,

## 2019-08-14 LAB
ANION GAP SERPL CALCULATED.3IONS-SCNC: 9 MMOL/L (ref 3–16)
BUN BLDV-MCNC: 8 MG/DL (ref 7–20)
CALCIUM SERPL-MCNC: 8.4 MG/DL (ref 8.3–10.6)
CHLORIDE BLD-SCNC: 108 MMOL/L (ref 99–110)
CO2: 21 MMOL/L (ref 21–32)
CREAT SERPL-MCNC: 0.7 MG/DL (ref 0.6–1.2)
GFR AFRICAN AMERICAN: >60
GFR NON-AFRICAN AMERICAN: >60
GLUCOSE BLD-MCNC: 111 MG/DL (ref 70–99)
HCT VFR BLD CALC: 25.3 % (ref 36–48)
HEMOGLOBIN: 8.5 G/DL (ref 12–16)
POTASSIUM REFLEX MAGNESIUM: 3.7 MMOL/L (ref 3.5–5.1)
SODIUM BLD-SCNC: 138 MMOL/L (ref 136–145)

## 2019-08-14 PROCEDURE — 2580000003 HC RX 258: Performed by: INTERNAL MEDICINE

## 2019-08-14 PROCEDURE — 85014 HEMATOCRIT: CPT

## 2019-08-14 PROCEDURE — 85027 COMPLETE CBC AUTOMATED: CPT

## 2019-08-14 PROCEDURE — 6370000000 HC RX 637 (ALT 250 FOR IP): Performed by: NURSE PRACTITIONER

## 2019-08-14 PROCEDURE — 36415 COLL VENOUS BLD VENIPUNCTURE: CPT

## 2019-08-14 PROCEDURE — 85018 HEMOGLOBIN: CPT

## 2019-08-14 PROCEDURE — 2580000003 HC RX 258: Performed by: NURSE PRACTITIONER

## 2019-08-14 PROCEDURE — 6360000002 HC RX W HCPCS: Performed by: NURSE PRACTITIONER

## 2019-08-14 PROCEDURE — C9113 INJ PANTOPRAZOLE SODIUM, VIA: HCPCS | Performed by: NURSE PRACTITIONER

## 2019-08-14 PROCEDURE — 80048 BASIC METABOLIC PNL TOTAL CA: CPT

## 2019-08-14 PROCEDURE — 1200000000 HC SEMI PRIVATE

## 2019-08-14 RX ADMIN — SODIUM CHLORIDE: 9 INJECTION, SOLUTION INTRAVENOUS at 00:35

## 2019-08-14 RX ADMIN — SODIUM CHLORIDE, PRESERVATIVE FREE 10 ML: 5 INJECTION INTRAVENOUS at 00:12

## 2019-08-14 RX ADMIN — PANTOPRAZOLE SODIUM 40 MG: 40 INJECTION, POWDER, FOR SOLUTION INTRAVENOUS at 00:12

## 2019-08-14 RX ADMIN — PANTOPRAZOLE SODIUM 40 MG: 40 INJECTION, POWDER, FOR SOLUTION INTRAVENOUS at 13:26

## 2019-08-14 RX ADMIN — Medication 10 ML: at 13:27

## 2019-08-14 RX ADMIN — SODIUM CHLORIDE: 9 INJECTION, SOLUTION INTRAVENOUS at 13:26

## 2019-08-14 RX ADMIN — SODIUM CHLORIDE, PRESERVATIVE FREE 10 ML: 5 INJECTION INTRAVENOUS at 08:43

## 2019-08-14 RX ADMIN — ATORVASTATIN CALCIUM 40 MG: 40 TABLET, FILM COATED ORAL at 08:43

## 2019-08-14 RX ADMIN — PANTOPRAZOLE SODIUM 40 MG: 40 INJECTION, POWDER, FOR SOLUTION INTRAVENOUS at 23:44

## 2019-08-14 RX ADMIN — Medication 10 ML: at 23:44

## 2019-08-14 ASSESSMENT — PAIN SCALES - GENERAL
PAINLEVEL_OUTOF10: 0

## 2019-08-14 NOTE — FLOWSHEET NOTE
08/13/19 1939   Assessment   Charting Type Shift assessment   Neurological   Neuro (WDL) WDL   Level of Consciousness 0   Orientation Level Oriented X4   [place] Coma Scale   Eye Opening 4   Best Verbal Response 5   Best Motor Response 6   Pedrito Coma Scale Score 15   HEENT   HEENT (WDL) X   Right Eye Impaired vision   Left Eye Impaired vision   Respiratory   Respiratory (WDL) X   Respiratory Pattern Regular   Respiratory Depth Normal   Respiratory Quality/Effort Unlabored   Chest Assessment Chest expansion symmetrical;Trachea midline   L Breath Sounds Diminished   R Breath Sounds Diminished   Cardiac   Cardiac (WDL) WDL   Rhythm Interpretation   Pulse 73   Cardiac Monitor   Telemetry Monitor On Yes   Telemetry Audible Yes   Telemetry Alarms Set Yes   Gastrointestinal   Abdominal (WDL) WDL   Peripheral Vascular   Peripheral Vascular (WDL) X   Edema Right lower extremity; Left lower extremity   RLE Edema Trace   LLE Edema Trace   Skin Color/Condition   Skin Color/Condition (WDL) WDL   Skin Color Appropriate for ethnicity   Skin Condition/Temp Warm   Skin Integrity   Skin Integrity (WDL) WDL   Musculoskeletal   Musculoskeletal (WDL) WDL   Genitourinary   Genitourinary (WDL) WDL   Flank Tenderness No   Suprapubic Tenderness No   Dysuria No   Anus/Rectum   Anus/Rectum (WDL) WDL   Psychosocial   Psychosocial (WDL) WDL

## 2019-08-15 VITALS
WEIGHT: 190.5 LBS | HEART RATE: 85 BPM | OXYGEN SATURATION: 99 % | SYSTOLIC BLOOD PRESSURE: 108 MMHG | BODY MASS INDEX: 31.74 KG/M2 | HEIGHT: 65 IN | DIASTOLIC BLOOD PRESSURE: 69 MMHG | RESPIRATION RATE: 18 BRPM | TEMPERATURE: 98.5 F

## 2019-08-15 LAB
ANION GAP SERPL CALCULATED.3IONS-SCNC: 10 MMOL/L (ref 3–16)
BUN BLDV-MCNC: 7 MG/DL (ref 7–20)
CALCIUM SERPL-MCNC: 8.5 MG/DL (ref 8.3–10.6)
CHLORIDE BLD-SCNC: 109 MMOL/L (ref 99–110)
CO2: 20 MMOL/L (ref 21–32)
CREAT SERPL-MCNC: 0.7 MG/DL (ref 0.6–1.2)
GFR AFRICAN AMERICAN: >60
GFR NON-AFRICAN AMERICAN: >60
GLUCOSE BLD-MCNC: 107 MG/DL (ref 70–99)
HCT VFR BLD CALC: 22.7 % (ref 36–48)
HCT VFR BLD CALC: 23 % (ref 36–48)
HEMATOLOGY PATH CONSULT: NO
HEMOGLOBIN: 7.5 G/DL (ref 12–16)
HEMOGLOBIN: 7.6 G/DL (ref 12–16)
MCH RBC QN AUTO: 37.8 PG (ref 26–34)
MCHC RBC AUTO-ENTMCNC: 32.7 G/DL (ref 31–36)
MCV RBC AUTO: 115.7 FL (ref 80–100)
PDW BLD-RTO: 20.2 % (ref 12.4–15.4)
PLATELET # BLD: 174 K/UL (ref 135–450)
PMV BLD AUTO: 8.7 FL (ref 5–10.5)
POTASSIUM REFLEX MAGNESIUM: 3.8 MMOL/L (ref 3.5–5.1)
RBC # BLD: 1.99 M/UL (ref 4–5.2)
SODIUM BLD-SCNC: 139 MMOL/L (ref 136–145)
WBC # BLD: 5.7 K/UL (ref 4–11)

## 2019-08-15 PROCEDURE — 2580000003 HC RX 258: Performed by: INTERNAL MEDICINE

## 2019-08-15 PROCEDURE — 36415 COLL VENOUS BLD VENIPUNCTURE: CPT

## 2019-08-15 PROCEDURE — 85018 HEMOGLOBIN: CPT

## 2019-08-15 PROCEDURE — 85014 HEMATOCRIT: CPT

## 2019-08-15 PROCEDURE — 80048 BASIC METABOLIC PNL TOTAL CA: CPT

## 2019-08-15 PROCEDURE — 6370000000 HC RX 637 (ALT 250 FOR IP): Performed by: NURSE PRACTITIONER

## 2019-08-15 RX ADMIN — SODIUM CHLORIDE: 9 INJECTION, SOLUTION INTRAVENOUS at 01:34

## 2019-08-15 RX ADMIN — ATORVASTATIN CALCIUM 40 MG: 40 TABLET, FILM COATED ORAL at 09:39

## 2019-08-15 ASSESSMENT — PAIN SCALES - GENERAL
PAINLEVEL_OUTOF10: 0
PAINLEVEL_OUTOF10: 0

## 2019-08-15 NOTE — DISCHARGE SUMMARY
Hospital Medicine Discharge Summary    Patient ID: Jose Carlos Mcconnell      Patient's PCP: Beatrice Nichole MD    Admit Date: 8/11/2019     Discharge Date:  8/15/2019    Admitting Physician: Chloe Alvarado MD     Discharge Physician: Sigrid Reece MD     Discharge Diagnoses: Active Hospital Problems    Diagnosis    GI bleed [K92.2]    Obesity [E66.9]    Tobacco use [Z72.0]    High blood pressure [I10]    Hyperlipidemia [E78.5]       The patient was seen and examined on day of discharge and this discharge summary is in conjunction with any daily progress note from day of discharge. HPI from admission H&P :   80 y.o. female, with PMH of HTN, HLD, obesity and tobacco use, who presented to [facility] with weakness and dizziness. The patient stated she has been having frequent episodes of weakness and dizziness for the last couple of months. She stated she has also been short of breath on exertion and her skin looks more pale than normal. The patient stated she has been having dark black stools for the last month or so. She stated she had a colonoscopy and EGD with Dr. Balta Llanos from [facility] in October, 2018. The patient stated at that time they found an ulcer in her esophagus and multiple in her stomach. She stated she was to follow up in 3 months with GI, but did not d/t caring for her mother. In the ED, the patients H/H was 7.9/23.3. She was given 1 UPRBC and protonix. She denied any other associated symptoms as well as any aggravating and/or alleviating factors. At the time of this assessment, the patient was resting comfortably in bed. She denied any chest pain, back pain . She got  admitted for further evaluation and a GI consult. Hospital Course: By problem list   Acute blood loss anemia in setting of upper GI bleed in pt with c/o weakness and dizziness.  7.9/23.3 on admission  -S/p 1 UPRBC transfused in ED  -protonix given in ED and to be continued daily    -GI

## 2019-08-15 NOTE — PLAN OF CARE
Problem: Falls - Risk of:  Goal: Will remain free from falls  Description  Will remain free from falls  8/15/2019 0838 by Aj Turner RN  Outcome: Ongoing  Note:   Patient will remain free of falls this shift. Patient is currently resting in bed with bed locked and in the lowest position. Patient's call light, belongings and bedside table are within reach. Patient is alert and oriented and uses call light appropriately. Will monitor. 8/15/2019 0559 by Kimani Romo RN  Outcome: Ongoing  Note:   Pt is free of falls at this time. Bed is in the lowest position, wheels locked, side rails up x 2, call light, and personal belongings within reach. Will continue to monitor.    Goal: Absence of physical injury  Description  Absence of physical injury  Outcome: Ongoing

## 2019-08-15 NOTE — FLOWSHEET NOTE
08/15/19 0835   Assessment   Charting Type Shift assessment   Neurological   Neuro (WDL) WDL   Level of Consciousness 0   Orientation Level Oriented X4   Stephenson Coma Scale   Eye Opening 4   Best Verbal Response 5   Best Motor Response 6   [place]rito Coma Scale Score 15   HEENT   HEENT (WDL) X   Right Eye Impaired vision   Left Eye Impaired vision   Respiratory   Respiratory (WDL) X   Respiratory Pattern Regular   Respiratory Depth Normal   Respiratory Quality/Effort Unlabored   Chest Assessment Chest expansion symmetrical;Trachea midline   L Breath Sounds Clear;Diminished   R Breath Sounds Clear;Diminished   Cardiac   Cardiac (WDL) WDL   Cardiac Monitor   Telemetry Monitor On Yes   Telemetry Audible Yes   Telemetry Alarms Set Yes   Gastrointestinal   Abdominal (WDL) X   Passing Flatus Y   Abdomen Inspection Soft   RUQ Bowel Sounds Active   LUQ Bowel Sounds Active   RLQ Bowel Sounds Active   LLQ Bowel Sounds Active   Peripheral Vascular   Peripheral Vascular (WDL) X   Edema Right lower extremity; Left lower extremity   RLE Edema Trace   LLE Edema Trace   Skin Color/Condition   Skin Color/Condition (WDL) WDL   Skin Color Appropriate for ethnicity   Skin Condition/Temp Warm   Skin Integrity   Skin Integrity (WDL) WDL   Musculoskeletal   Musculoskeletal (WDL) WDL   Genitourinary   Genitourinary (WDL) WDL   Flank Tenderness No   Suprapubic Tenderness No   Dysuria No   Urine Assessment   Incontinence No   Anus/Rectum   Anus/Rectum (WDL) X   Evaluation Hemorrhoids   Psychosocial   Psychosocial (WDL) WDL

## 2020-03-21 ENCOUNTER — APPOINTMENT (OUTPATIENT)
Dept: GENERAL RADIOLOGY | Age: 70
DRG: 378 | End: 2020-03-21
Payer: MEDICARE

## 2020-03-21 ENCOUNTER — HOSPITAL ENCOUNTER (INPATIENT)
Age: 70
LOS: 3 days | Discharge: HOME | DRG: 378 | End: 2020-03-24
Attending: EMERGENCY MEDICINE | Admitting: INTERNAL MEDICINE
Payer: MEDICARE

## 2020-03-21 PROBLEM — K21.9 GERD (GASTROESOPHAGEAL REFLUX DISEASE): Status: ACTIVE | Noted: 2020-03-21

## 2020-03-21 PROBLEM — F10.10 ETOH ABUSE: Status: ACTIVE | Noted: 2020-03-21

## 2020-03-21 PROBLEM — D64.9 ANEMIA: Status: ACTIVE | Noted: 2020-03-21

## 2020-03-21 LAB
A/G RATIO: 1.4 (ref 1.1–2.2)
ABO/RH: NORMAL
ALBUMIN SERPL-MCNC: 3.3 G/DL (ref 3.4–5)
ALP BLD-CCNC: 95 U/L (ref 40–129)
ALT SERPL-CCNC: 48 U/L (ref 10–40)
ANION GAP SERPL CALCULATED.3IONS-SCNC: 21 MMOL/L (ref 3–16)
ANISOCYTOSIS: ABNORMAL
ANTIBODY SCREEN: NORMAL
AST SERPL-CCNC: 87 U/L (ref 15–37)
BACTERIA: ABNORMAL /HPF
BASOPHILS ABSOLUTE: 0.1 K/UL (ref 0–0.2)
BASOPHILS RELATIVE PERCENT: 1.1 %
BILIRUB SERPL-MCNC: 0.5 MG/DL (ref 0–1)
BILIRUBIN URINE: NEGATIVE
BLOOD, URINE: ABNORMAL
BUN BLDV-MCNC: 17 MG/DL (ref 7–20)
CALCIUM SERPL-MCNC: 8.8 MG/DL (ref 8.3–10.6)
CHLORIDE BLD-SCNC: 95 MMOL/L (ref 99–110)
CLARITY: CLEAR
CO2: 18 MMOL/L (ref 21–32)
COLOR: YELLOW
CREAT SERPL-MCNC: 0.8 MG/DL (ref 0.6–1.2)
EOSINOPHILS ABSOLUTE: 0 K/UL (ref 0–0.6)
EOSINOPHILS RELATIVE PERCENT: 0.5 %
EPITHELIAL CELLS, UA: ABNORMAL /HPF (ref 0–5)
ETHANOL: NORMAL MG/DL (ref 0–0.08)
GFR AFRICAN AMERICAN: >60
GFR NON-AFRICAN AMERICAN: >60
GLOBULIN: 2.3 G/DL
GLUCOSE BLD-MCNC: 124 MG/DL (ref 70–99)
GLUCOSE URINE: NEGATIVE MG/DL
HCT VFR BLD CALC: 20.2 % (ref 36–48)
HCT VFR BLD CALC: 23.2 % (ref 36–48)
HEMOGLOBIN: 6.3 G/DL (ref 12–16)
HEMOGLOBIN: 7.3 G/DL (ref 12–16)
HYPOCHROMIA: ABNORMAL
INR BLD: 0.88 (ref 0.86–1.14)
KETONES, URINE: ABNORMAL MG/DL
LEUKOCYTE ESTERASE, URINE: ABNORMAL
LIPASE: 67 U/L (ref 13–60)
LYMPHOCYTES ABSOLUTE: 1 K/UL (ref 1–5.1)
LYMPHOCYTES RELATIVE PERCENT: 16.3 %
MCH RBC QN AUTO: 31.1 PG (ref 26–34)
MCHC RBC AUTO-ENTMCNC: 31.2 G/DL (ref 31–36)
MCV RBC AUTO: 99.9 FL (ref 80–100)
MICROCYTES: ABNORMAL
MICROSCOPIC EXAMINATION: YES
MONOCYTES ABSOLUTE: 0.6 K/UL (ref 0–1.3)
MONOCYTES RELATIVE PERCENT: 9.1 %
MUCUS: ABNORMAL /LPF
NEUTROPHILS ABSOLUTE: 4.6 K/UL (ref 1.7–7.7)
NEUTROPHILS RELATIVE PERCENT: 73 %
NITRITE, URINE: NEGATIVE
OCCULT BLOOD SCREENING: ABNORMAL
PDW BLD-RTO: 23 % (ref 12.4–15.4)
PH UA: 6 (ref 5–8)
PLATELET # BLD: 289 K/UL (ref 135–450)
PLATELET SLIDE REVIEW: ADEQUATE
PMV BLD AUTO: 8.4 FL (ref 5–10.5)
POLYCHROMASIA: ABNORMAL
POTASSIUM SERPL-SCNC: 4.1 MMOL/L (ref 3.5–5.1)
PROTEIN UA: NEGATIVE MG/DL
PROTHROMBIN TIME: 10.2 SEC (ref 10–13.2)
RBC # BLD: 2.03 M/UL (ref 4–5.2)
RBC UA: ABNORMAL /HPF (ref 0–4)
SLIDE REVIEW: ABNORMAL
SODIUM BLD-SCNC: 134 MMOL/L (ref 136–145)
SPECIFIC GRAVITY UA: 1.01 (ref 1–1.03)
SPECIMEN STATUS: NORMAL
TARGET CELLS: ABNORMAL
TOTAL PROTEIN: 5.6 G/DL (ref 6.4–8.2)
URINE TYPE: ABNORMAL
UROBILINOGEN, URINE: 0.2 E.U./DL
WBC # BLD: 6.2 K/UL (ref 4–11)
WBC UA: ABNORMAL /HPF (ref 0–5)

## 2020-03-21 PROCEDURE — 80053 COMPREHEN METABOLIC PANEL: CPT

## 2020-03-21 PROCEDURE — 6370000000 HC RX 637 (ALT 250 FOR IP): Performed by: INTERNAL MEDICINE

## 2020-03-21 PROCEDURE — P9016 RBC LEUKOCYTES REDUCED: HCPCS

## 2020-03-21 PROCEDURE — C9113 INJ PANTOPRAZOLE SODIUM, VIA: HCPCS | Performed by: INTERNAL MEDICINE

## 2020-03-21 PROCEDURE — 99285 EMERGENCY DEPT VISIT HI MDM: CPT

## 2020-03-21 PROCEDURE — 6360000002 HC RX W HCPCS: Performed by: PHYSICIAN ASSISTANT

## 2020-03-21 PROCEDURE — 36415 COLL VENOUS BLD VENIPUNCTURE: CPT

## 2020-03-21 PROCEDURE — 85025 COMPLETE CBC W/AUTO DIFF WBC: CPT

## 2020-03-21 PROCEDURE — 85610 PROTHROMBIN TIME: CPT

## 2020-03-21 PROCEDURE — 2580000003 HC RX 258: Performed by: INTERNAL MEDICINE

## 2020-03-21 PROCEDURE — 2580000003 HC RX 258: Performed by: PHYSICIAN ASSISTANT

## 2020-03-21 PROCEDURE — G0480 DRUG TEST DEF 1-7 CLASSES: HCPCS

## 2020-03-21 PROCEDURE — 83690 ASSAY OF LIPASE: CPT

## 2020-03-21 PROCEDURE — 36430 TRANSFUSION BLD/BLD COMPNT: CPT

## 2020-03-21 PROCEDURE — C9113 INJ PANTOPRAZOLE SODIUM, VIA: HCPCS | Performed by: PHYSICIAN ASSISTANT

## 2020-03-21 PROCEDURE — 86850 RBC ANTIBODY SCREEN: CPT

## 2020-03-21 PROCEDURE — 85014 HEMATOCRIT: CPT

## 2020-03-21 PROCEDURE — 1200000000 HC SEMI PRIVATE

## 2020-03-21 PROCEDURE — 6360000002 HC RX W HCPCS: Performed by: INTERNAL MEDICINE

## 2020-03-21 PROCEDURE — 71046 X-RAY EXAM CHEST 2 VIEWS: CPT

## 2020-03-21 PROCEDURE — 86923 COMPATIBILITY TEST ELECTRIC: CPT

## 2020-03-21 PROCEDURE — 2500000003 HC RX 250 WO HCPCS: Performed by: PHYSICIAN ASSISTANT

## 2020-03-21 PROCEDURE — 85018 HEMOGLOBIN: CPT

## 2020-03-21 PROCEDURE — 81001 URINALYSIS AUTO W/SCOPE: CPT

## 2020-03-21 PROCEDURE — 86900 BLOOD TYPING SEROLOGIC ABO: CPT

## 2020-03-21 PROCEDURE — 93005 ELECTROCARDIOGRAM TRACING: CPT | Performed by: EMERGENCY MEDICINE

## 2020-03-21 PROCEDURE — 86901 BLOOD TYPING SEROLOGIC RH(D): CPT

## 2020-03-21 PROCEDURE — G0328 FECAL BLOOD SCRN IMMUNOASSAY: HCPCS

## 2020-03-21 RX ORDER — LORAZEPAM 2 MG/ML
4 INJECTION INTRAMUSCULAR
Status: DISCONTINUED | OUTPATIENT
Start: 2020-03-21 | End: 2020-03-24 | Stop reason: HOSPADM

## 2020-03-21 RX ORDER — LORAZEPAM 1 MG/1
3 TABLET ORAL
Status: DISCONTINUED | OUTPATIENT
Start: 2020-03-21 | End: 2020-03-24 | Stop reason: HOSPADM

## 2020-03-21 RX ORDER — ATORVASTATIN CALCIUM 40 MG/1
40 TABLET, FILM COATED ORAL DAILY
Status: DISCONTINUED | OUTPATIENT
Start: 2020-03-21 | End: 2020-03-24 | Stop reason: HOSPADM

## 2020-03-21 RX ORDER — SODIUM CHLORIDE 0.9 % (FLUSH) 0.9 %
10 SYRINGE (ML) INJECTION EVERY 12 HOURS SCHEDULED
Status: DISCONTINUED | OUTPATIENT
Start: 2020-03-21 | End: 2020-03-24 | Stop reason: HOSPADM

## 2020-03-21 RX ORDER — LORAZEPAM 2 MG/ML
3 INJECTION INTRAMUSCULAR
Status: DISCONTINUED | OUTPATIENT
Start: 2020-03-21 | End: 2020-03-24 | Stop reason: HOSPADM

## 2020-03-21 RX ORDER — LORAZEPAM 2 MG/ML
2 INJECTION INTRAMUSCULAR
Status: DISCONTINUED | OUTPATIENT
Start: 2020-03-21 | End: 2020-03-24 | Stop reason: HOSPADM

## 2020-03-21 RX ORDER — LORAZEPAM 1 MG/1
2 TABLET ORAL
Status: DISCONTINUED | OUTPATIENT
Start: 2020-03-21 | End: 2020-03-24 | Stop reason: HOSPADM

## 2020-03-21 RX ORDER — ACETAMINOPHEN 650 MG/1
650 SUPPOSITORY RECTAL EVERY 6 HOURS PRN
Status: DISCONTINUED | OUTPATIENT
Start: 2020-03-21 | End: 2020-03-24 | Stop reason: HOSPADM

## 2020-03-21 RX ORDER — DIAZEPAM 5 MG/1
5 TABLET ORAL 3 TIMES DAILY
Status: DISCONTINUED | OUTPATIENT
Start: 2020-03-21 | End: 2020-03-24 | Stop reason: HOSPADM

## 2020-03-21 RX ORDER — LORAZEPAM 1 MG/1
4 TABLET ORAL
Status: DISCONTINUED | OUTPATIENT
Start: 2020-03-21 | End: 2020-03-24 | Stop reason: HOSPADM

## 2020-03-21 RX ORDER — ONDANSETRON 2 MG/ML
4 INJECTION INTRAMUSCULAR; INTRAVENOUS EVERY 6 HOURS PRN
Status: DISCONTINUED | OUTPATIENT
Start: 2020-03-21 | End: 2020-03-24 | Stop reason: HOSPADM

## 2020-03-21 RX ORDER — POLYETHYLENE GLYCOL 3350 17 G/17G
17 POWDER, FOR SOLUTION ORAL DAILY PRN
Status: DISCONTINUED | OUTPATIENT
Start: 2020-03-21 | End: 2020-03-24 | Stop reason: HOSPADM

## 2020-03-21 RX ORDER — PROMETHAZINE HYDROCHLORIDE 25 MG/1
12.5 TABLET ORAL EVERY 6 HOURS PRN
Status: DISCONTINUED | OUTPATIENT
Start: 2020-03-21 | End: 2020-03-24 | Stop reason: HOSPADM

## 2020-03-21 RX ORDER — LORAZEPAM 1 MG/1
1 TABLET ORAL
Status: DISCONTINUED | OUTPATIENT
Start: 2020-03-21 | End: 2020-03-24 | Stop reason: HOSPADM

## 2020-03-21 RX ORDER — LORAZEPAM 2 MG/ML
1 INJECTION INTRAMUSCULAR
Status: DISCONTINUED | OUTPATIENT
Start: 2020-03-21 | End: 2020-03-24 | Stop reason: HOSPADM

## 2020-03-21 RX ORDER — 0.9 % SODIUM CHLORIDE 0.9 %
20 INTRAVENOUS SOLUTION INTRAVENOUS ONCE
Status: COMPLETED | OUTPATIENT
Start: 2020-03-21 | End: 2020-03-21

## 2020-03-21 RX ORDER — SODIUM CHLORIDE 0.9 % (FLUSH) 0.9 %
10 SYRINGE (ML) INJECTION PRN
Status: DISCONTINUED | OUTPATIENT
Start: 2020-03-21 | End: 2020-03-24 | Stop reason: HOSPADM

## 2020-03-21 RX ORDER — ACETAMINOPHEN 325 MG/1
650 TABLET ORAL EVERY 6 HOURS PRN
Status: DISCONTINUED | OUTPATIENT
Start: 2020-03-21 | End: 2020-03-24 | Stop reason: HOSPADM

## 2020-03-21 RX ADMIN — ATORVASTATIN CALCIUM 40 MG: 40 TABLET, FILM COATED ORAL at 17:29

## 2020-03-21 RX ADMIN — SODIUM CHLORIDE 20 ML: 900 INJECTION, SOLUTION INTRAVENOUS at 17:07

## 2020-03-21 RX ADMIN — SODIUM CHLORIDE 8 MG/HR: 9 INJECTION, SOLUTION INTRAVENOUS at 18:10

## 2020-03-21 RX ADMIN — DIAZEPAM 5 MG: 5 TABLET ORAL at 22:15

## 2020-03-21 RX ADMIN — FOLIC ACID: 5 INJECTION, SOLUTION INTRAMUSCULAR; INTRAVENOUS; SUBCUTANEOUS at 15:27

## 2020-03-21 RX ADMIN — SODIUM CHLORIDE 80 MG: 9 INJECTION, SOLUTION INTRAVENOUS at 17:25

## 2020-03-21 ASSESSMENT — PAIN SCALES - GENERAL: PAINLEVEL_OUTOF10: 0

## 2020-03-21 NOTE — ED NOTES
Called GI @ [facility] reg gib anemia per CARLA Harden Dr. returned call @ 6 [facility]  03/21/20 8272

## 2020-03-21 NOTE — H&P
Hospital Medicine History & Physical      PCP: Hermelinda Beckett MD    Date of Admission: 3/21/2020    Date of Service: Pt seen/examined on 21 March and Admitted to Inpatient with expected LOS greater than two midnights due to medical therapy. Chief Complaint:  Fatigue      History Of Present Illness:        77 y.o. female alcoholic normally consuming 2 bottles of wine daily who presented to [facility] via EMS w/ a several day hx of progressive weakness/fatigue culminating in profound weakness on day of admission w/ near syncopal event. She states that she has not had much to eat or drink over the past several days secondary to neglect. Lives at home alone. She reports her last drink as Friday 20 March    Denies BRBPR/Hematemesis or Melena. The patient denies any fever/chills or other signs/sxs of systemic illness or identifiable aggravating/alleviating factors. Past Medical History:          Diagnosis Date    Arthritis     Cancer ([facility] [address]     skin    COPD (chronic obstructive pulmonary disease) (HCC)     Fatty liver     Fractures     Gastric ulcer     High blood pressure     Hyperlipidemia     Palpitations        Past Surgical History:          Procedure Laterality Date    HYSTERECTOMY  1988    INTRACAPSULAR CATARACT EXTRACTION Left 1/11/2019    PHACOEMULSIFICATION OF CATARACT LEFT EYE WITH INTRAOCULAR LENS IMPLANT performed by Diandra Mccloud MD at [facility]    UPPER GASTROINTESTINAL ENDOSCOPY N/A 8/13/2019    EGD BIOPSY performed by Nicola Goddard MD at [address]       Medications Prior to Admission:      Prior to Admission medications    Medication Sig Start Date End Date Taking? Authorizing Provider   atorvastatin (LIPITOR) 40 MG tablet TAKE 1 TABLET BY MOUTH DAILY.  12/25/16  Yes Historical Provider, MD   pantoprazole (PROTONIX) 40 MG tablet Take 40 mg by mouth daily Historical Provider, MD       Allergies:  Lisinopril and Sulphadimidine [sulfamethazine]    Social History:      The patient currently lives independently    TOBACCO:   reports that she has been smoking. She has been smoking about 1.00 pack per day. She has never used smokeless tobacco.  ETOH:   reports current alcohol use. Family History:      Reviewed in detail and negative for DM, CAD, CVA. Positive as follows:        Problem Relation Age of Onset    Osteoarthritis Mother     Cancer Paternal Grandmother        REVIEW OF SYSTEMS:   Pertinent positives as noted in the HPI. All other systems reviewed and negative. PHYSICAL EXAM:    BP (!) 114/59   Pulse 100   Temp 98.3 °F (36.8 °C) (Oral)   Resp 16   Ht 5' 5\" (1.651 m)   Wt 180 lb (81.6 kg)   SpO2 100%   BMI 29.95 kg/m²     General appearance:  No apparent distress, appears stated age and cooperative. HEENT:  Normal cephalic, atraumatic without obvious deformity. Pupils equal, round, and reactive to light. Extra ocular muscles intact. Conjunctivae/corneas clear. Neck: Supple, with full range of motion. No jugular venous distention. Trachea midline. Respiratory:  Normal respiratory effort. Clear to auscultation, bilaterally without Rales/Wheezes/Rhonchi. Cardiovascular:  Regular rate and rhythm with normal S1/S2 without murmurs, rubs or gallops. Abdomen: Soft, non-tender, non-distended with normal bowel sounds. Musculoskeletal:  No clubbing, cyanosis or edema bilaterally. Full range of motion without deformity. Skin: Skin color, texture, turgor normal.  No rashes or lesions. Neurologic:  Neurovascularly intact without any focal sensory/motor deficits.  Cranial nerves: II-XII intact, grossly non-focal.  Psychiatric:  Alert and oriented, thought content appropriate, normal insight  Capillary Refill: Brisk,< 3 seconds   Peripheral Pulses: +2 palpable, equal bilaterally       CXR:  I have reviewed the CXR with the following interpretation: No acute ASD   EKG:  I have reviewed the EKG with the following interpretation: Sinus Tach w/out acute ischemic changes. Labs:     Recent Labs     03/21/20  1346   WBC 6.2   HGB 6.3*   HCT 20.2*        Recent Labs     03/21/20  1346   *   K 4.1   CL 95*   CO2 18*   BUN 17   CREATININE 0.8   CALCIUM 8.8     Recent Labs     03/21/20  1346   AST 87*   ALT 48*   BILITOT 0.5   ALKPHOS 95     Recent Labs     03/21/20  1346   INR 0.88     No results for input(s): CKTOTAL, TROPONINI in the last 72 hours. Urinalysis:      Lab Results   Component Value Date    NITRU Negative 08/12/2019    WBCUA 3-5 08/12/2019    BACTERIA Rare 08/12/2019    RBCUA 0-2 08/12/2019    BLOODU Negative 08/12/2019    SPECGRAV 1.015 08/12/2019    GLUCOSEU Negative 08/12/2019         ASSESSMENT:    Active Hospital Problems    Diagnosis Date Noted    Anemia [D64.9] 03/21/2020    GERD (gastroesophageal reflux disease) [K21.9] 03/21/2020    ETOH abuse [F10.10] 03/21/2020    Obesity [E66.9] 08/12/2019    Hyperlipidemia [E78.5] 08/12/2019    GI bleed [K92.2] 08/12/2019       PLAN:      GI Bleed - likely Upper GIBleed of unclear etiology. FOBT positive in ED. GI consulted from ED and appreciated in advance. Clears on admission and NPO after Mn. Anemia - chronic w/ acute decline likely 2nd to acute GI blood loss w/out evidence of ongoing hemodynamically active bleeding/hemolysis. Symptomatic w/ transfusion 1 unit PRBCs ordered in ED. Follow serial labs. Reviewed and documented as above. GERD - w/out active signs/sxs of dysphagia/odynophagia. Possible hx of active PUD w/ hx of GI bleed. Controlled on home PPI - started on Bolus/gtt in ED - continued. HyperLipidemia - controlled on home Statin. Continue, w/ f/u and med adjustment w/ PCP    Alcohol Abuse - active and ongoing w/ dependence normally consuming 2 bottles of wine daily w/out signs/sxs of active w/drawal.  Cessation counseled.   Placed on scheduled Valium, low

## 2020-03-21 NOTE — ED PROVIDER NOTES
negative    PHYSICAL EXAM  BP (!) 112/55   Pulse 95   Temp 98.9 °F (37.2 °C) (Oral)   Resp 18   Ht 5' 5\" (1.651 m)   Wt 180 lb (81.6 kg)   SpO2 100%   BMI 29.95 kg/m²   GENERAL APPEARANCE: Awake and alert. Cooperative. Disheveled. No acute distress. HEAD: Normocephalic. Atraumatic.  no hematomas, lesions, lacerations or abrasions. Negative for hill signs or raccoon's eyes. EYES: PERRL. EOM's grossly intact. ENT: Mucous membranes are moist.   NECK: Supple. No midline bony tenderness. No deformities or step-offs. No JVD. No tracheal tenderness or deviation. No crepitus. HEART: RRR. No murmurs. No chest wall tenderness. LUNGS: Respirations unlabored. CTAB. Good air exchange. Speaking comfortably in full sentences. No wheezing, rhonchi, rales. ABDOMEN: Soft. Non-distended. Non-tender. No guarding or rebound. No midline pulsatile mass. : Good rectal tone noted. No evidence to suggest rectal impactions at this time. Stool noted to gloved finger was brown in color. Guaiac positive. Exam performed with nursing chaperone. EXTREMITIES: No peripheral edema. Moves all extremities equally. All extremities neurovascularly intact. SKIN: Warm and dry. No acute rashes. NEUROLOGICAL: Alert and oriented. CN's 2-12 intact. No gross facial drooping. Strength 5/5, sensation intact. PSYCHIATRIC: Normal mood and affect. ED COURSE   I have evaluated this patient in collaboration with Dr. Jefferson Munoz. Pain control was not required while here in the emergency department. Triage vital stable. Initiated on a banana bag. EKG was a normal sinus rhythm without evidence to suggest acute ischemic change. CBC does show anemia with H&H at 6.3 and 20.2. Hemoccult positive. Initiated on Protonix bolus and drip. Patient reports that she does have a history of gastric ulcers. Not currently on PPIs. Does have history of anemia requiring transfusion. CMP fairly unremarkable.   Lipase normal.  No ethanol detected. INR within normal limits. 1 unit of PRBCs ordered and pending infusion. Stable chest x-ray. Discussed case with both GI specialist as well as hospital medicine regarding admission and orders have been placed. A discussion was had with Mrs. Caballero regarding fatigue and near syncopal episode, ED findings and recommendations for admission. All questions were answered. Patient is in agreement. 30 minutes of critical care time was spent not including any separately billable procedures.     MDM  Results for orders placed or performed during the hospital encounter of 03/21/20   CBC Auto Differential   Result Value Ref Range    WBC 6.2 4.0 - 11.0 K/uL    RBC 2.03 (L) 4.00 - 5.20 M/uL    Hemoglobin 6.3 (LL) 12.0 - 16.0 g/dL    Hematocrit 20.2 (LL) 36.0 - 48.0 %    MCV 99.9 80.0 - 100.0 fL    MCH 31.1 26.0 - 34.0 pg    MCHC 31.2 31.0 - 36.0 g/dL    RDW 23.0 (H) 12.4 - 15.4 %    Platelets 648 811 - 660 K/uL    MPV 8.4 5.0 - 10.5 fL    PLATELET SLIDE REVIEW Adequate     SLIDE REVIEW see below     Neutrophils % 73.0 %    Lymphocytes % 16.3 %    Monocytes % 9.1 %    Eosinophils % 0.5 %    Basophils % 1.1 %    Neutrophils Absolute 4.6 1.7 - 7.7 K/uL    Lymphocytes Absolute 1.0 1.0 - 5.1 K/uL    Monocytes Absolute 0.6 0.0 - 1.3 K/uL    Eosinophils Absolute 0.0 0.0 - 0.6 K/uL    Basophils Absolute 0.1 0.0 - 0.2 K/uL    Anisocytosis 2+ (A)     Microcytes 2+ (A)     Polychromasia 1+ (A)     Hypochromia 1+ (A)     Target Cells Occasional (A)    Comprehensive Metabolic Panel   Result Value Ref Range    Sodium 134 (L) 136 - 145 mmol/L    Potassium 4.1 3.5 - 5.1 mmol/L    Chloride 95 (L) 99 - 110 mmol/L    CO2 18 (L) 21 - 32 mmol/L    Anion Gap 21 (H) 3 - 16    Glucose 124 (H) 70 - 99 mg/dL    BUN 17 7 - 20 mg/dL    CREATININE 0.8 0.6 - 1.2 mg/dL    GFR Non-African American >60 >60    GFR African American >60 >60    Calcium 8.8 8.3 - 10.6 mg/dL    Total Protein 5.6 (L) 6.4 - 8.2 g/dL    Alb 3.3 (L) 3.4 - 5.0 g/dL Blood pressure (!) 112/55, pulse 95, temperature 98.9 °F (37.2 °C), temperature source Oral, resp. rate 18, height 5' 5\" (1.651 m), weight 180 lb (81.6 kg), SpO2 100 %. DISPOSITION  Patient was admitted to the hospital in stable condition.           [place], [place]  03/21/20 [address] [place] [address]  03/21/20 4456

## 2020-03-22 LAB
A/G RATIO: 1.3 (ref 1.1–2.2)
ALBUMIN SERPL-MCNC: 2.9 G/DL (ref 3.4–5)
ALBUMIN SERPL-MCNC: 3.5 G/DL (ref 3.4–5)
ALP BLD-CCNC: 102 U/L (ref 40–129)
ALT SERPL-CCNC: 44 U/L (ref 10–40)
ANION GAP SERPL CALCULATED.3IONS-SCNC: 13 MMOL/L (ref 3–16)
ANION GAP SERPL CALCULATED.3IONS-SCNC: 16 MMOL/L (ref 3–16)
AST SERPL-CCNC: 72 U/L (ref 15–37)
BILIRUB SERPL-MCNC: 0.8 MG/DL (ref 0–1)
BLOOD BANK DISPENSE STATUS: NORMAL
BLOOD BANK DISPENSE STATUS: NORMAL
BLOOD BANK PRODUCT CODE: NORMAL
BLOOD BANK PRODUCT CODE: NORMAL
BPU ID: NORMAL
BPU ID: NORMAL
BUN BLDV-MCNC: 13 MG/DL (ref 7–20)
BUN BLDV-MCNC: 15 MG/DL (ref 7–20)
CALCIUM SERPL-MCNC: 8.4 MG/DL (ref 8.3–10.6)
CALCIUM SERPL-MCNC: 9 MG/DL (ref 8.3–10.6)
CHLORIDE BLD-SCNC: 101 MMOL/L (ref 99–110)
CHLORIDE BLD-SCNC: 102 MMOL/L (ref 99–110)
CO2: 19 MMOL/L (ref 21–32)
CO2: 21 MMOL/L (ref 21–32)
CREAT SERPL-MCNC: 0.7 MG/DL (ref 0.6–1.2)
CREAT SERPL-MCNC: 0.7 MG/DL (ref 0.6–1.2)
DESCRIPTION BLOOD BANK: NORMAL
DESCRIPTION BLOOD BANK: NORMAL
EKG ATRIAL RATE: 100 BPM
EKG DIAGNOSIS: NORMAL
EKG P AXIS: 53 DEGREES
EKG P-R INTERVAL: 132 MS
EKG Q-T INTERVAL: 360 MS
EKG QRS DURATION: 96 MS
EKG QTC CALCULATION (BAZETT): 464 MS
EKG R AXIS: 56 DEGREES
EKG T AXIS: 9 DEGREES
EKG VENTRICULAR RATE: 100 BPM
GFR AFRICAN AMERICAN: >60
GFR AFRICAN AMERICAN: >60
GFR NON-AFRICAN AMERICAN: >60
GFR NON-AFRICAN AMERICAN: >60
GLOBULIN: 2.6 G/DL
GLUCOSE BLD-MCNC: 110 MG/DL (ref 70–99)
GLUCOSE BLD-MCNC: 87 MG/DL (ref 70–99)
HCT VFR BLD CALC: 19.2 % (ref 36–48)
HCT VFR BLD CALC: 20.5 % (ref 36–48)
HCT VFR BLD CALC: 25.4 % (ref 36–48)
HCT VFR BLD CALC: 30.3 % (ref 36–48)
HEMOGLOBIN: 6.2 G/DL (ref 12–16)
HEMOGLOBIN: 6.6 G/DL (ref 12–16)
HEMOGLOBIN: 8 G/DL (ref 12–16)
HEMOGLOBIN: 9.4 G/DL (ref 12–16)
MAGNESIUM: 2 MG/DL (ref 1.8–2.4)
MCH RBC QN AUTO: 30.4 PG (ref 26–34)
MCHC RBC AUTO-ENTMCNC: 32.3 G/DL (ref 31–36)
MCV RBC AUTO: 94 FL (ref 80–100)
PDW BLD-RTO: 21.7 % (ref 12.4–15.4)
PHOSPHORUS: 4 MG/DL (ref 2.5–4.9)
PLATELET # BLD: 246 K/UL (ref 135–450)
PMV BLD AUTO: 8.1 FL (ref 5–10.5)
POTASSIUM REFLEX MAGNESIUM: 4.1 MMOL/L (ref 3.5–5.1)
POTASSIUM SERPL-SCNC: 3.7 MMOL/L (ref 3.5–5.1)
RBC # BLD: 2.17 M/UL (ref 4–5.2)
SODIUM BLD-SCNC: 135 MMOL/L (ref 136–145)
SODIUM BLD-SCNC: 137 MMOL/L (ref 136–145)
TOTAL PROTEIN: 6.1 G/DL (ref 6.4–8.2)
WBC # BLD: 6.1 K/UL (ref 4–11)

## 2020-03-22 PROCEDURE — 85027 COMPLETE CBC AUTOMATED: CPT

## 2020-03-22 PROCEDURE — 6370000000 HC RX 637 (ALT 250 FOR IP): Performed by: INTERNAL MEDICINE

## 2020-03-22 PROCEDURE — 6360000002 HC RX W HCPCS: Performed by: INTERNAL MEDICINE

## 2020-03-22 PROCEDURE — 2500000003 HC RX 250 WO HCPCS: Performed by: INTERNAL MEDICINE

## 2020-03-22 PROCEDURE — 83735 ASSAY OF MAGNESIUM: CPT

## 2020-03-22 PROCEDURE — P9016 RBC LEUKOCYTES REDUCED: HCPCS

## 2020-03-22 PROCEDURE — 85014 HEMATOCRIT: CPT

## 2020-03-22 PROCEDURE — C9113 INJ PANTOPRAZOLE SODIUM, VIA: HCPCS | Performed by: INTERNAL MEDICINE

## 2020-03-22 PROCEDURE — 36430 TRANSFUSION BLD/BLD COMPNT: CPT

## 2020-03-22 PROCEDURE — 93010 ELECTROCARDIOGRAM REPORT: CPT | Performed by: INTERNAL MEDICINE

## 2020-03-22 PROCEDURE — 1200000000 HC SEMI PRIVATE

## 2020-03-22 PROCEDURE — 36415 COLL VENOUS BLD VENIPUNCTURE: CPT

## 2020-03-22 PROCEDURE — 85018 HEMOGLOBIN: CPT

## 2020-03-22 PROCEDURE — 2580000003 HC RX 258: Performed by: INTERNAL MEDICINE

## 2020-03-22 PROCEDURE — 80053 COMPREHEN METABOLIC PANEL: CPT

## 2020-03-22 RX ORDER — 0.9 % SODIUM CHLORIDE 0.9 %
250 INTRAVENOUS SOLUTION INTRAVENOUS ONCE
Status: COMPLETED | OUTPATIENT
Start: 2020-03-22 | End: 2020-03-22

## 2020-03-22 RX ADMIN — FOLIC ACID: 5 INJECTION, SOLUTION INTRAMUSCULAR; INTRAVENOUS; SUBCUTANEOUS at 08:36

## 2020-03-22 RX ADMIN — DIAZEPAM 5 MG: 5 TABLET ORAL at 14:18

## 2020-03-22 RX ADMIN — ATORVASTATIN CALCIUM 40 MG: 40 TABLET, FILM COATED ORAL at 08:36

## 2020-03-22 RX ADMIN — SODIUM CHLORIDE 8 MG/HR: 9 INJECTION, SOLUTION INTRAVENOUS at 03:22

## 2020-03-22 RX ADMIN — DIAZEPAM 5 MG: 5 TABLET ORAL at 08:36

## 2020-03-22 RX ADMIN — DIAZEPAM 5 MG: 5 TABLET ORAL at 21:11

## 2020-03-22 RX ADMIN — SODIUM CHLORIDE 250 ML: 9 INJECTION, SOLUTION INTRAVENOUS at 11:00

## 2020-03-22 ASSESSMENT — PAIN SCALES - GENERAL
PAINLEVEL_OUTOF10: 0
PAINLEVEL_OUTOF10: 0

## 2020-03-22 NOTE — PROGRESS NOTES
Hospitalist Progress Note  3/22/2020 1:41 PM  Subjective:   Admit Date: 3/21/2020  PCP: Payal Viera MD Status: Inpatient [101]  Interval History: Hospital Day: 2, admitted with several day history of melena, progressive weakness and fatigue and found to have GI bleed, likely upper GI blood. So far, she has required 2 units PRBC for hemoglobin < 7.0 gm/dL. History of gastric ulcer treated with Carafate and pantoprazole about two years ago. Unknown if tested for H pylori. DIET FULL LIQUID;  Right forearm peripheral IV (3/21, day #2)  Medications:   Banana Bag (thiamine / folate) infusion  pantoprozole (PROTONIX) infusion 8 mg/hr      diazePAM  5 mg Oral TID   atorvastatin  40 mg Oral Daily     Recent Labs     03/21/20  1346 03/21/20  2107 03/22/20  0405 03/22/20  1003   WBC 6.2  --  6.1  --    HGB 6.3* 7.3* 6.6* 6.2*     --  246  --    MCV 99.9  --  94.0  --      Recent Labs     03/21/20  1346 03/22/20  0405   * 135*   K 4.1 3.7   CL 95* 101   CO2 18* 21   BUN 17 15   CREATININE 0.8 0.7   GLUCOSE 124* 87     Recent Labs     03/21/20  1346   AST 87*   ALT 48*   BILITOT 0.5   ALKPHOS 95     INR:  0.88    PA/lat CXR:  No acute cardiopulmonary disease. Objective:   Vitals:  /79   Pulse 95   Temp 98.5 °F (36.9 °C) (Oral)   Resp 18   Ht 5' 5\" (1.651 m)   Wt 180 lb (81.6 kg)   SpO2 98%   BMI 29.95 kg/m²   General appearance: alert and cooperative with exam  Lungs: clear to auscultation bilaterally  Heart: regular rate and rhythm, S1, S2 normal, no murmur, click, rub or gallop  Abdomen: epigastric tenderness, audible bowel sounds  Extremities: extremities normal, atraumatic, no cyanosis or edema  Neurologic: No obvious focal neurologic deficits. Assessment and Plan:   1. GI bleed, likely upper GI bleed, in the setting of alcohol use disorder:  INR not elevated. Pantoprazole gtt. Volume expansion with IV saline. Gastroenterology evaluation pending.     2. Alcohol use disorder: She drinks wine every day. CIWA protocol with lorazepam.  Scheduled diazepam 5 mg TID. 3. Alcohol cirrhosis:  She was apparently on spironolactone but discontinued. 4. Hypertension:  Normotensive off losartan. 5. Dyslipidemia:  Continues on atorvastatin 40 mg nightly. 6. Nicotine dependence:  Nicotine replacement therapy declined. Advance Directive: Full Code  DVT prophylaxis with enoxaparin 40 mg sub-Q daily.    Discharge planning: will require another two days inpatient status      Carmel Corbett8, MD  Rounding Hospitalist

## 2020-03-23 ENCOUNTER — ANESTHESIA (OUTPATIENT)
Dept: ENDOSCOPY | Age: 70
DRG: 378 | End: 2020-03-23
Payer: MEDICARE

## 2020-03-23 ENCOUNTER — ANESTHESIA EVENT (OUTPATIENT)
Dept: ENDOSCOPY | Age: 70
DRG: 378 | End: 2020-03-23
Payer: MEDICARE

## 2020-03-23 VITALS — DIASTOLIC BLOOD PRESSURE: 76 MMHG | OXYGEN SATURATION: 100 % | SYSTOLIC BLOOD PRESSURE: 121 MMHG

## 2020-03-23 VITALS
DIASTOLIC BLOOD PRESSURE: 83 MMHG | HEART RATE: 84 BPM | HEIGHT: 65 IN | TEMPERATURE: 97.8 F | BODY MASS INDEX: 29.99 KG/M2 | OXYGEN SATURATION: 100 % | RESPIRATION RATE: 18 BRPM | WEIGHT: 180 LBS | SYSTOLIC BLOOD PRESSURE: 130 MMHG

## 2020-03-23 LAB
BASOPHILS ABSOLUTE: 0 K/UL (ref 0–0.2)
BASOPHILS RELATIVE PERCENT: 0.5 %
EOSINOPHILS ABSOLUTE: 0.1 K/UL (ref 0–0.6)
EOSINOPHILS RELATIVE PERCENT: 0.8 %
HCT VFR BLD CALC: 23.1 % (ref 36–48)
HCT VFR BLD CALC: 23.4 % (ref 36–48)
HCT VFR BLD CALC: 26.4 % (ref 36–48)
HEMOGLOBIN: 7.5 G/DL (ref 12–16)
HEMOGLOBIN: 7.5 G/DL (ref 12–16)
HEMOGLOBIN: 8.5 G/DL (ref 12–16)
LYMPHOCYTES ABSOLUTE: 1.3 K/UL (ref 1–5.1)
LYMPHOCYTES RELATIVE PERCENT: 20.6 %
MCH RBC QN AUTO: 28.8 PG (ref 26–34)
MCHC RBC AUTO-ENTMCNC: 32.4 G/DL (ref 31–36)
MCV RBC AUTO: 88.8 FL (ref 80–100)
MONOCYTES ABSOLUTE: 0.7 K/UL (ref 0–1.3)
MONOCYTES RELATIVE PERCENT: 10.5 %
NEUTROPHILS ABSOLUTE: 4.3 K/UL (ref 1.7–7.7)
NEUTROPHILS RELATIVE PERCENT: 67.6 %
PDW BLD-RTO: 26.9 % (ref 12.4–15.4)
PLATELET # BLD: 230 K/UL (ref 135–450)
PMV BLD AUTO: 7.9 FL (ref 5–10.5)
RBC # BLD: 2.61 M/UL (ref 4–5.2)
WBC # BLD: 6.4 K/UL (ref 4–11)

## 2020-03-23 PROCEDURE — 6360000002 HC RX W HCPCS: Performed by: INTERNAL MEDICINE

## 2020-03-23 PROCEDURE — 85018 HEMOGLOBIN: CPT

## 2020-03-23 PROCEDURE — 2580000003 HC RX 258: Performed by: ANESTHESIOLOGY

## 2020-03-23 PROCEDURE — 2500000003 HC RX 250 WO HCPCS: Performed by: INTERNAL MEDICINE

## 2020-03-23 PROCEDURE — 6360000002 HC RX W HCPCS: Performed by: NURSE ANESTHETIST, CERTIFIED REGISTERED

## 2020-03-23 PROCEDURE — 2709999900 HC NON-CHARGEABLE SUPPLY: Performed by: INTERNAL MEDICINE

## 2020-03-23 PROCEDURE — C9113 INJ PANTOPRAZOLE SODIUM, VIA: HCPCS | Performed by: INTERNAL MEDICINE

## 2020-03-23 PROCEDURE — 7100000010 HC PHASE II RECOVERY - FIRST 15 MIN: Performed by: INTERNAL MEDICINE

## 2020-03-23 PROCEDURE — 3700000001 HC ADD 15 MINUTES (ANESTHESIA): Performed by: INTERNAL MEDICINE

## 2020-03-23 PROCEDURE — 3700000000 HC ANESTHESIA ATTENDED CARE: Performed by: INTERNAL MEDICINE

## 2020-03-23 PROCEDURE — 85025 COMPLETE CBC W/AUTO DIFF WBC: CPT

## 2020-03-23 PROCEDURE — 1200000000 HC SEMI PRIVATE

## 2020-03-23 PROCEDURE — 0DJ08ZZ INSPECTION OF UPPER INTESTINAL TRACT, VIA NATURAL OR ARTIFICIAL OPENING ENDOSCOPIC: ICD-10-PCS | Performed by: INTERNAL MEDICINE

## 2020-03-23 PROCEDURE — 2500000003 HC RX 250 WO HCPCS: Performed by: NURSE ANESTHETIST, CERTIFIED REGISTERED

## 2020-03-23 PROCEDURE — 7100000011 HC PHASE II RECOVERY - ADDTL 15 MIN: Performed by: INTERNAL MEDICINE

## 2020-03-23 PROCEDURE — 6370000000 HC RX 637 (ALT 250 FOR IP): Performed by: INTERNAL MEDICINE

## 2020-03-23 PROCEDURE — 36415 COLL VENOUS BLD VENIPUNCTURE: CPT

## 2020-03-23 PROCEDURE — 85014 HEMATOCRIT: CPT

## 2020-03-23 PROCEDURE — 2580000003 HC RX 258: Performed by: INTERNAL MEDICINE

## 2020-03-23 PROCEDURE — 3609017100 HC EGD: Performed by: INTERNAL MEDICINE

## 2020-03-23 RX ORDER — LIDOCAINE HYDROCHLORIDE 20 MG/ML
INJECTION, SOLUTION EPIDURAL; INFILTRATION; INTRACAUDAL; PERINEURAL PRN
Status: DISCONTINUED | OUTPATIENT
Start: 2020-03-23 | End: 2020-03-23 | Stop reason: SDUPTHER

## 2020-03-23 RX ORDER — PROPOFOL 10 MG/ML
INJECTION, EMULSION INTRAVENOUS PRN
Status: DISCONTINUED | OUTPATIENT
Start: 2020-03-23 | End: 2020-03-23 | Stop reason: SDUPTHER

## 2020-03-23 RX ORDER — SODIUM CHLORIDE 9 MG/ML
INJECTION, SOLUTION INTRAVENOUS CONTINUOUS PRN
Status: DISCONTINUED | OUTPATIENT
Start: 2020-03-23 | End: 2020-03-23 | Stop reason: SDUPTHER

## 2020-03-23 RX ADMIN — DIAZEPAM 5 MG: 5 TABLET ORAL at 08:50

## 2020-03-23 RX ADMIN — FOLIC ACID: 5 INJECTION, SOLUTION INTRAMUSCULAR; INTRAVENOUS; SUBCUTANEOUS at 08:50

## 2020-03-23 RX ADMIN — DIAZEPAM 5 MG: 5 TABLET ORAL at 21:32

## 2020-03-23 RX ADMIN — LIDOCAINE HYDROCHLORIDE 40 MG: 20 INJECTION, SOLUTION EPIDURAL; INFILTRATION; INTRACAUDAL; PERINEURAL at 11:23

## 2020-03-23 RX ADMIN — SODIUM CHLORIDE: 9 INJECTION, SOLUTION INTRAVENOUS at 11:17

## 2020-03-23 RX ADMIN — Medication 10 ML: at 22:43

## 2020-03-23 RX ADMIN — PROPOFOL 80 MG: 10 INJECTION, EMULSION INTRAVENOUS at 11:23

## 2020-03-23 RX ADMIN — DIAZEPAM 5 MG: 5 TABLET ORAL at 14:23

## 2020-03-23 RX ADMIN — SODIUM CHLORIDE 8 MG/HR: 9 INJECTION, SOLUTION INTRAVENOUS at 14:21

## 2020-03-23 RX ADMIN — SODIUM CHLORIDE 8 MG/HR: 9 INJECTION, SOLUTION INTRAVENOUS at 02:35

## 2020-03-23 RX ADMIN — ATORVASTATIN CALCIUM 40 MG: 40 TABLET, FILM COATED ORAL at 08:50

## 2020-03-23 ASSESSMENT — PAIN SCALES - GENERAL
PAINLEVEL_OUTOF10: 0
PAINLEVEL_OUTOF10: 0

## 2020-03-23 ASSESSMENT — COPD QUESTIONNAIRES: CAT_SEVERITY: MILD

## 2020-03-23 ASSESSMENT — LIFESTYLE VARIABLES: SMOKING_STATUS: 1

## 2020-03-23 ASSESSMENT — PAIN - FUNCTIONAL ASSESSMENT: PAIN_FUNCTIONAL_ASSESSMENT: 0-10

## 2020-03-23 NOTE — FLOWSHEET NOTE
03/23/20 1330   Encounter Summary   Services provided to: Patient   Continue Visiting   (3/23 Tele Call;thx for call-blessings)   Complexity of Encounter Low   Length of Encounter 15 minutes   Spiritual Assessment Completed Yes   Spiritual/Buddhism   Type Spiritual support   Assessment Approachable   Intervention Nurtured hope   Outcome Expressed gratitude

## 2020-03-23 NOTE — PLAN OF CARE
Problem: Falls - Risk of:  Goal: Will remain free from falls  Description: Will remain free from falls  Outcome: Ongoing  Note: Bed in lowest, locked position, side rails up X2, bed check in place, call light within reach. Instructed pt to call for assistance before getting out of bed, pt verbalized understanding. Will continue to monitor.

## 2020-03-23 NOTE — PLAN OF CARE
Problem: Falls - Risk of:  Goal: Will remain free from falls  Description: Will remain free from falls  3/23/2020 0859 by Wayne Eller RN  Outcome: Ongoing     Problem: Discharge Planning:  Goal: Discharged to appropriate level of care  Description: Discharged to appropriate level of care  Outcome: Ongoing     Problem: Bowel Function - Altered:  Goal: Bowel elimination is within specified parameters  Description: Bowel elimination is within specified parameters  Outcome: Ongoing     Problem:  Activity Intolerance:  Goal: Ability to tolerate increased activity will improve  Description: Ability to tolerate increased activity will improve  Outcome: Ongoing     Problem: Physical Regulation:  Goal: Complications related to the disease process, condition or treatment will be avoided or minimized  Description: Complications related to the disease process, condition or treatment will be avoided or minimized  Outcome: Ongoing

## 2020-03-23 NOTE — PROGRESS NOTES
Hospitalist Progress Note      PCP: Adam Velez MD    Date of Admission: 3/21/2020    Chief Complaint: Fatigue    Subjective: no new c/o. Medications:  Reviewed    Infusion Medications    pantoprozole (PROTONIX) infusion 8 mg/hr (03/23/20 0235)     Scheduled Medications    diazePAM  5 mg Oral TID    sodium chloride flush  10 mL Intravenous 2 times per day    atorvastatin  40 mg Oral Daily    folic acid, thiamine, multi-vitamin with vitamin K infusion   Intravenous Daily     PRN Meds: sodium chloride flush, acetaminophen **OR** acetaminophen, polyethylene glycol, promethazine **OR** ondansetron, LORazepam **OR** LORazepam **OR** LORazepam **OR** LORazepam **OR** LORazepam **OR** LORazepam **OR** LORazepam **OR** LORazepam      Intake/Output Summary (Last 24 hours) at 3/23/2020 0817  Last data filed at 3/22/2020 1753  Gross per 24 hour   Intake 880 ml   Output --   Net 880 ml       Physical Exam Performed:    /73   Pulse 80   Temp 98.4 °F (36.9 °C) (Oral)   Resp 15   Ht 5' 5\" (1.651 m)   Wt 180 lb (81.6 kg)   SpO2 100%   BMI 29.95 kg/m²     General appearance: No apparent distress, appears stated age and cooperative. HEENT: Pupils equal, round, and reactive to light. Conjunctivae/corneas clear. Neck: Supple, with full range of motion. No jugular venous distention. Trachea midline. Respiratory:  Normal respiratory effort. Clear to auscultation, bilaterally without Rales/Wheezes/Rhonchi. Cardiovascular: Regular rate and rhythm with normal S1/S2 without murmurs, rubs or gallops. Abdomen: Soft, non-tender, non-distended with normal bowel sounds. Musculoskeletal: No clubbing, cyanosis or edema bilaterally. Full range of motion without deformity. Skin: Skin color, texture, turgor normal.  No rashes or lesions. Neurologic:  Neurovascularly intact without any focal sensory/motor deficits.  Cranial nerves: II-XII intact, grossly non-focal.  Psychiatric: Alert and oriented, thought content appropriate, normal insight  Capillary Refill: Brisk,< 3 seconds   Peripheral Pulses: +2 palpable, equal bilaterally       Labs:   Recent Labs     03/21/20  1346  03/22/20  0405  03/22/20  1559 03/22/20  2135 03/23/20  0340   WBC 6.2  --  6.1  --   --   --  6.4   HGB 6.3*   < > 6.6*   < > 9.4* 8.0* 7.5*   HCT 20.2*   < > 20.5*   < > 30.3* 25.4* 23.1*     --  246  --   --   --  230    < > = values in this interval not displayed. Recent Labs     03/21/20  1346 03/22/20  0405 03/22/20  1559   * 135* 137   K 4.1 3.7 4.1   CL 95* 101 102   CO2 18* 21 19*   BUN 17 15 13   CREATININE 0.8 0.7 0.7   CALCIUM 8.8 8.4 9.0   PHOS  --  4.0  --      Recent Labs     03/21/20  1346 03/22/20  1559   AST 87* 72*   ALT 48* 44*   BILITOT 0.5 0.8   ALKPHOS 95 102     Recent Labs     03/21/20  1346   INR 0.88     No results for input(s): Chuy Alvarez in the last 72 hours. Urinalysis:      Lab Results   Component Value Date    NITRU Negative 03/21/2020    WBCUA 0-2 03/21/2020    BACTERIA 2+ 03/21/2020    RBCUA 0-2 03/21/2020    BLOODU SMALL 03/21/2020    SPECGRAV 1.015 03/21/2020    GLUCOSEU Negative 03/21/2020       Consults:    IP CONSULT TO HOSPITALIST  IP CONSULT TO GI  IP CONSULT TO GI      Assessment/Plan:    Active Hospital Problems    Diagnosis    Anemia [D64.9]    GERD (gastroesophageal reflux disease) [K21.9]    ETOH abuse [F10.10]    Obesity [E66.9]    Hyperlipidemia [E78.5]    GI bleed [K92.2]          GI Bleed - likely Upper GIBleed of unclear etiology. FOBT positive in ED. GI consulted from ED and appreciated in advance. Clears on admission and NPO after Mn.     Anemia - chronic w/ acute decline likely 2nd to acute GI blood loss w/out evidence of ongoing hemodynamically active bleeding/hemolysis. Symptomatic s/p transfusion 2 units PRBCs. Followed serial labs. Reviewed and documented as above.     GERD - w/out active signs/sxs of dysphagia/odynophagia.  Possible hx of active PUD w/

## 2020-03-23 NOTE — OP NOTE
Esophagogastroduodenoscopy Note    Patient:   Henry Major    YOB: 1950    Facility:   [facility] [Inpatient]   Referring/PCP: Mike Seals MD    Procedure:   Esophagogastroduodenoscopy --diagnostic  Date:     3/23/2020   Endoscopist:  Elan Caceres     Preoperative Diagnosis:   Anemia and melena H/H 6/19, MCV 99. Heme-pos. Postoperative Diagnosis:  normal    Anesthesia:  Propofol  Estimated blood loss: None  Complications: None    Description of Procedure:  Informed consent was obtained from the patient after explanation of the procedure including indications, description of the procedure,  benefits and possible risks and complications of the procedure, and alternatives. Questions were answered. The patient's history was reviewed and a directed physical examination was performed prior to the procedure. Patient was monitored throughout the procedure with pulse oximetry and periodic assessment of vital signs. Patient was sedated as noted above. The Nursing staff and I performed a time out. With the patient in the left lateral decubitus position, the Olympus videoendoscope was placed in the patient's mouth and under direct visualization passed into the esophagus. The scope was ultimately passed to the third portion of the duodenum. Visualization was performed during both introduction and withdrawal of the endoscope and retroflexed view of the proximal stomach was obtained. Findings[de-identified]   Esophagus: normal. The findings do not support a diagnosis of Alexis's Esophagus. Stomach: normal  Duodenum: normal    Recommendations: -Follow clinical symptoms and laboratory studies for evidence of rebleeding. , -Needs to be worked up for non GI sources of anemia, and will need outpatient colonoscopy.     Elan Caceres MD       (O) [phone]          Elan Caceres MD

## 2020-03-23 NOTE — H&P
 ondansetron (ZOFRAN) injection 4 mg  4 mg Intravenous Q6H PRN Gustavo Olguin MD        LORazepam (ATIVAN) tablet 1 mg  1 mg Oral Q1H PRN Gustavo Olguin MD        Or    LORazepam (ATIVAN) injection 1 mg  1 mg Intravenous Q1H PRN Gustavo Olguin MD        Or    LORazepam (ATIVAN) tablet 2 mg  2 mg Oral Q1H PRN Gustavo Olguin MD        Or    LORazepam (ATIVAN) injection 2 mg  2 mg Intravenous Q1H PRN Gustavo Olguin MD        Or    LORazepam (ATIVAN) tablet 3 mg  3 mg Oral Q1H PRN Gustavo Olguin MD        Or    LORazepam (ATIVAN) injection 3 mg  3 mg Intravenous Q1H PRN Gustavo Olguin MD        Or    LORazepam (ATIVAN) tablet 4 mg  4 mg Oral Q1H PRN Gustavo Olguin MD        Or    LORazepam (ATIVAN) injection 4 mg  4 mg Intravenous Q1H PRN Gustavo Olguin MD        atorvastatin (LIPITOR) tablet 40 mg  40 mg Oral Daily Gustavo Olguin MD   40 mg at 03/23/20 0850    sodium chloride 0.9 % 2,595 mL with folic acid 1 mg, adult multi-vitamin with vitamin k 10 mL, thiamine 100 mg   Intravenous Daily Gustavo Olguin  mL/hr at 03/23/20 8621       Facility-Administered Medications Ordered in Other Encounters   Medication Dose Route Frequency Provider Last Rate Last Dose    0.9 % sodium chloride infusion    Continuous PRN Mary Garcia MD           Nurses notes reviewed and agreed. Medications reviewed  Allergies: Allergies   Allergen Reactions    Lisinopril      cough    Sulphadimidine [Sulfamethazine] Rash           Physical Exam:  Vital Signs: BP (!) 112/59   Pulse 79   Temp 97.7 °F (36.5 °C) (Temporal)   Resp 16   Ht 5' 5\" (1.651 m)   Wt 180 lb (81.6 kg)   SpO2 100%   BMI 29.95 kg/m²  Body mass index is 29.95 kg/m².   Airway:Normal  Cardiac:Normal  Pulmonary:Normal  Abdomen:Normal  Specific to procedure: EGD      Pre-Procedure Assessment/Plan:  ASA 3 - Patient with moderate systemic disease with functional limitations  [place] I  Level of Sedation Plan:Deep sedation    Post Procedure plan: Return to same level of care    I assessed the patient and find that the patient is in satisfactory condition to proceed with the planned procedure and sedation plan. I have explained the risk, benefits, and alternatives to the procedure. The patient understands and agrees to proceed.   Yes    Analia Quinn MD       (O) [phone]          Analia Quinn  11:18 AM 3/23/2020

## 2020-03-24 LAB
ALBUMIN SERPL-MCNC: 2.7 G/DL (ref 3.4–5)
ANION GAP SERPL CALCULATED.3IONS-SCNC: 11 MMOL/L (ref 3–16)
BUN BLDV-MCNC: 9 MG/DL (ref 7–20)
CALCIUM SERPL-MCNC: 8.7 MG/DL (ref 8.3–10.6)
CHLORIDE BLD-SCNC: 106 MMOL/L (ref 99–110)
CO2: 22 MMOL/L (ref 21–32)
CREAT SERPL-MCNC: 1 MG/DL (ref 0.6–1.2)
GFR AFRICAN AMERICAN: >60
GFR NON-AFRICAN AMERICAN: 55
GLUCOSE BLD-MCNC: 109 MG/DL (ref 70–99)
HCT VFR BLD CALC: 23.7 % (ref 36–48)
HEMOGLOBIN: 7.6 G/DL (ref 12–16)
MCH RBC QN AUTO: 29 PG (ref 26–34)
MCHC RBC AUTO-ENTMCNC: 32 G/DL (ref 31–36)
MCV RBC AUTO: 90.4 FL (ref 80–100)
PDW BLD-RTO: 26.3 % (ref 12.4–15.4)
PHOSPHORUS: 5.1 MG/DL (ref 2.5–4.9)
PLATELET # BLD: 231 K/UL (ref 135–450)
PMV BLD AUTO: 8.2 FL (ref 5–10.5)
POTASSIUM SERPL-SCNC: 3.9 MMOL/L (ref 3.5–5.1)
RBC # BLD: 2.62 M/UL (ref 4–5.2)
SODIUM BLD-SCNC: 139 MMOL/L (ref 136–145)
WBC # BLD: 6.6 K/UL (ref 4–11)

## 2020-03-24 PROCEDURE — 6360000002 HC RX W HCPCS: Performed by: INTERNAL MEDICINE

## 2020-03-24 PROCEDURE — C9113 INJ PANTOPRAZOLE SODIUM, VIA: HCPCS | Performed by: INTERNAL MEDICINE

## 2020-03-24 PROCEDURE — 6370000000 HC RX 637 (ALT 250 FOR IP): Performed by: INTERNAL MEDICINE

## 2020-03-24 PROCEDURE — 2580000003 HC RX 258: Performed by: INTERNAL MEDICINE

## 2020-03-24 PROCEDURE — 36415 COLL VENOUS BLD VENIPUNCTURE: CPT

## 2020-03-24 PROCEDURE — 2500000003 HC RX 250 WO HCPCS: Performed by: INTERNAL MEDICINE

## 2020-03-24 PROCEDURE — 85027 COMPLETE CBC AUTOMATED: CPT

## 2020-03-24 PROCEDURE — 80069 RENAL FUNCTION PANEL: CPT

## 2020-03-24 RX ORDER — PANTOPRAZOLE SODIUM 40 MG/1
40 TABLET, DELAYED RELEASE ORAL
Status: DISCONTINUED | OUTPATIENT
Start: 2020-03-24 | End: 2020-03-24 | Stop reason: HOSPADM

## 2020-03-24 RX ADMIN — ATORVASTATIN CALCIUM 40 MG: 40 TABLET, FILM COATED ORAL at 10:24

## 2020-03-24 RX ADMIN — SODIUM CHLORIDE 8 MG/HR: 9 INJECTION, SOLUTION INTRAVENOUS at 02:42

## 2020-03-24 RX ADMIN — Medication 10 ML: at 10:26

## 2020-03-24 RX ADMIN — PANTOPRAZOLE SODIUM 40 MG: 40 TABLET, DELAYED RELEASE ORAL at 10:33

## 2020-03-24 RX ADMIN — FOLIC ACID: 5 INJECTION, SOLUTION INTRAMUSCULAR; INTRAVENOUS; SUBCUTANEOUS at 08:50

## 2020-03-24 NOTE — PROGRESS NOTES
Jonelle Ochoa is a 77 y.o. female patient.     Current Facility-Administered Medications   Medication Dose Route Frequency Provider Last Rate Last Dose    pantoprazole (PROTONIX) tablet 40 mg  40 mg Oral QAM AC Clayton Mccall MD        diazePAM (VALIUM) tablet 5 mg  5 mg Oral TID Clayton Mccall MD   5 mg at 03/23/20 2132    sodium chloride flush 0.9 % injection 10 mL  10 mL Intravenous 2 times per day Clayton Mccall MD   10 mL at 03/23/20 2243    sodium chloride flush 0.9 % injection 10 mL  10 mL Intravenous PRN Clayton Mccall MD        acetaminophen (TYLENOL) tablet 650 mg  650 mg Oral Q6H PRN Clayton Mccall MD        Or   [facility] acetaminophen (TYLENOL) suppository 650 mg  650 mg Rectal Q6H PRN Clayton Mccall MD        polyethylene glycol (GLYCOLAX) packet 17 g  17 g Oral Daily PRN Clayton Mccall MD        promethazine (PHENERGAN) tablet 12.5 mg  12.5 mg Oral Q6H PRN Clayton Mccall MD        Or    [facility] injection 4 mg  4 mg Intravenous Q6H PRN Clayton Mccall MD        LORazepam (ATIVAN) tablet 1 mg  1 mg Oral Q1H PRN Clayton Mccall MD        Or    LORazepam (ATIVAN) injection 1 mg  1 mg Intravenous Q1H PRFREDDY Mccall MD        Or    LORazepam (ATIVAN) tablet 2 mg  2 mg Oral Q1H PRFREDDY Mccall MD        Or    LORazepam (ATIVAN) injection 2 mg  2 mg Intravenous Q1H PRN Clayton Mccall MD        Or    LORazepam (ATIVAN) tablet 3 mg  3 mg Oral Q1H PRN Clayton Mccall MD        Or    LORazepam (ATIVAN) injection 3 mg  3 mg Intravenous Q1H PRN Clayton Mccall MD        Or    LORazepam (ATIVAN) tablet 4 mg  4 mg Oral Q1H PRN Clayton Mccall MD        Or    LORazepam (ATIVAN) injection 4 mg  4 mg Intravenous Q1H PRN Clayton Mccall MD        atorvastatin (LIPITOR) tablet 40 mg  40 mg Oral Daily Clayton Mccall MD   40 mg at 03/23/20 9891     Allergies   Allergen Reactions    Lisinopril      cough    Sulphadimidine [Sulfamethazine] Rash     Principal Problem:    GI bleed  Active Problems:    Obesity    Hyperlipidemia    Anemia    GERD (gastroesophageal reflux disease)    ETOH abuse  Resolved Problems:    * No resolved hospital problems. *    Blood pressure 130/83, pulse 84, temperature 97.8 °F (36.6 °C), temperature source Axillary, resp. rate 18, height 5' 5\" (1.651 m), weight 180 lb (81.6 kg), SpO2 100 %. Subjective:  Symptoms:  Stable. Pain:  She reports no pain. Objective:  General Appearance: In no acute distress and not in pain. Vital signs: (most recent): Blood pressure 130/83, pulse 84, temperature 97.8 °F (36.6 °C), temperature source Axillary, resp. rate 18, height 5' 5\" (1.651 m), weight 180 lb (81.6 kg), SpO2 100 %. Heart: Normal rate. Regular rhythm. S1 normal and S2 normal.    Abdomen: Abdomen is soft. Bowel sounds are normal.   There is no abdominal tenderness. Assessment & Plan  79 yo w ETOH abuse, HTN and h/o PUD and \"esoph ulcer\" who has anemia and melena, H/H 6/19, MCV 99, transfused. Is found to be heme-pos. She reports a neg. Colonoscopy about a year ago w [facility] except for a polyp (report not available to me). EGD 3/23 neg. No bm's in >24 hrs.     Plan:   1. Supportive care  2. Daily H/H  3. OK to D/C when stable  4.  Will follow in office for possible repeat colonoscopy vs push enteroscopy vs Capsule Endoscopy    Neha Muller MD       (O) [phone]          Neha Muller MD  3/24/2020

## 2020-03-24 NOTE — PROGRESS NOTES
Pt's IV line removed without complications. Discussed d/c instructions with patient, given opportunity to ask questions, and provided new medication education with side effects. Follow up appointment information included in d/c instructions. Pt verbalized understanding of d/c instructions. Patient was discharged to home with all belongings and taken outside via wheelchair.     Clarissa Apple

## 2020-03-24 NOTE — PROGRESS NOTES
Hospitalist Progress Note      PCP: Yue Plunkett MD    Date of Admission: 3/21/2020    Chief Complaint: Fatigue    Subjective: no new c/o. Medications:  Reviewed    Infusion Medications    pantoprozole (PROTONIX) infusion 8 mg/hr (03/24/20 0242)     Scheduled Medications    diazePAM  5 mg Oral TID    sodium chloride flush  10 mL Intravenous 2 times per day    atorvastatin  40 mg Oral Daily    folic acid, thiamine, multi-vitamin with vitamin K infusion   Intravenous Daily     PRN Meds: sodium chloride flush, acetaminophen **OR** acetaminophen, polyethylene glycol, promethazine **OR** ondansetron, LORazepam **OR** LORazepam **OR** LORazepam **OR** LORazepam **OR** LORazepam **OR** LORazepam **OR** LORazepam **OR** LORazepam      Intake/Output Summary (Last 24 hours) at 3/24/2020 0811  Last data filed at 3/23/2020 1356  Gross per 24 hour   Intake 360 ml   Output --   Net 360 ml       Physical Exam Performed:    /83   Pulse 84   Temp 97.8 °F (36.6 °C) (Axillary)   Resp 18   Ht 5' 5\" (1.651 m)   Wt 180 lb (81.6 kg)   SpO2 100%   BMI 29.95 kg/m²     General appearance: No apparent distress, appears stated age and cooperative. HEENT: Pupils equal, round, and reactive to light. Conjunctivae/corneas clear. Neck: Supple, with full range of motion. No jugular venous distention. Trachea midline. Respiratory:  Normal respiratory effort. Clear to auscultation, bilaterally without Rales/Wheezes/Rhonchi. Cardiovascular: Regular rate and rhythm with normal S1/S2 without murmurs, rubs or gallops. Abdomen: Soft, non-tender, non-distended with normal bowel sounds. Musculoskeletal: No clubbing, cyanosis or edema bilaterally. Full range of motion without deformity. Skin: Skin color, texture, turgor normal.  No rashes or lesions. Neurologic:  Neurovascularly intact without any focal sensory/motor deficits.  Cranial nerves: II-XII intact, grossly non-focal.  Psychiatric: Alert and oriented, thought content appropriate, normal insight  Capillary Refill: Brisk,< 3 seconds   Peripheral Pulses: +2 palpable, equal bilaterally       Labs:   Recent Labs     03/22/20  0405  03/23/20  0340 03/23/20  0856 03/23/20  1616 03/24/20  0606   WBC 6.1  --  6.4  --   --  6.6   HGB 6.6*   < > 7.5* 7.5* 8.5* 7.6*   HCT 20.5*   < > 23.1* 23.4* 26.4* 23.7*     --  230  --   --  231    < > = values in this interval not displayed. Recent Labs     03/22/20  0405 03/22/20  1559 03/24/20  0606   * 137 139   K 3.7 4.1 3.9    102 106   CO2 21 19* 22   BUN 15 13 9   CREATININE 0.7 0.7 1.0   CALCIUM 8.4 9.0 8.7   PHOS 4.0  --  5.1*     Recent Labs     03/21/20  1346 03/22/20  1559   AST 87* 72*   ALT 48* 44*   BILITOT 0.5 0.8   ALKPHOS 95 102     Recent Labs     03/21/20  1346   INR 0.88     No results for input(s): Arnoldo Efrainck in the last 72 hours. Urinalysis:      Lab Results   Component Value Date    NITRU Negative 03/21/2020    WBCUA 0-2 03/21/2020    BACTERIA 2+ 03/21/2020    RBCUA 0-2 03/21/2020    BLOODU SMALL 03/21/2020    SPECGRAV 1.015 03/21/2020    GLUCOSEU Negative 03/21/2020       Consults:    IP CONSULT TO HOSPITALIST  IP CONSULT TO GI  IP CONSULT TO GI      Assessment/Plan:    Active Hospital Problems    Diagnosis    Anemia [D64.9]    GERD (gastroesophageal reflux disease) [K21.9]    ETOH abuse [F10.10]    Obesity [E66.9]    Hyperlipidemia [E78.5]    GI bleed [K92.2]          GI Bleed - likely Upper GIBleed of unclear etiology. FOBT positive in ED. GI consulted from ED and appreciated s/p EGD w/out acute findings. Tolerating diet. Plan for outpt colonoscopy.      Anemia - chronic w/ acute decline likely 2nd to acute GI blood loss w/out evidence of ongoing hemodynamically active bleeding/hemolysis. Symptomatic s/p transfusion 2 units PRBCs. Followed serial labs. Reviewed and documented as above.     GERD - w/out active signs/sxs of dysphagia/odynophagia.  Possible hx of active PUD w/ hx of GI bleed. Controlled on home PPI - started on Bolus/gtt in ED - continued. HTN - w/out known CAD and no evidence of active signs/sxs of ischemia/failure. Currently controlled off home Losartan w/ vitals reviewed and documented as above. Tobacco Abuse - active and ongoing. Cessation counseled. Nicotine replacement PRN.     HyperLipidemia - controlled on home Statin. Continue, w/ f/u and med adjustment w/ PCP     Alcohol Abuse - active and ongoing w/ dependence normally consuming 2 bottles of wine daily w/out signs/sxs of active w/drawal.  Cessation counseled. Placed on scheduled Valium, low dose. Started on CIWA w/ PRN Ativan. Banana bag ordered x 3 days.     Obesity -  With Body mass index is 29.95 kg/m². Complicating assessment and treatment. Placing patient at risk for multiple co-morbidities as well as early death and contributing to the patient's presentation.  Counseled on weight loss.        DVT Prophylaxis: IPC  Diet: DIET LOW FAT;  Code Status: Full Code      PT/OT Eval Status: Not yet ordered.      Dispo - Likely Tues 24 March pending clinical improvement        Diandra Fan MD

## 2020-03-26 NOTE — DISCHARGE SUMMARY
Hospital Medicine Discharge Summary    Patient ID: Jonelle Ochoa      Patient's PCP: Jessica Bowman MD    Admit Date: 3/21/2020     Discharge Date: 3/24/2020      Admitting Physician: Clayton Mccall MD     Discharge Physician: Clayton Mccall MD     Discharge Diagnoses: Active Hospital Problems    Diagnosis    Anemia [D64.9]    GERD (gastroesophageal reflux disease) [K21.9]    ETOH abuse [F10.10]    Obesity [E66.9]    Hyperlipidemia [E78.5]    GI bleed [K92.2]       The patient was seen and examined on day of discharge and this discharge summary is in conjunction with any daily progress note from day of discharge. Hospital Course:          GI Bleed - likely Upper GIBleed of unclear etiology.  FOBT positive in ED.  GI consulted from ED and appreciated s/p EGD w/out acute findings. Tolerating diet. Plan for outpt colonoscopy.      Anemia - chronic w/ acute decline likely 2nd to acute GI blood loss w/out evidence of ongoing hemodynamically active bleeding/hemolysis.  Symptomatic on admission s/p transfusion 2 units PRBCs.      GERD - w/out active signs/sxs of dysphagia/odynophagia. Possible hx of active PUD w/ hx of GI bleed. Controlled on home PPI - started on Bolus/gtt in ED - continued PO.      HTN - w/out known CAD and no evidence of active signs/sxs of ischemia/failure. Currently controlled on home Losartan     Tobacco Abuse - active and ongoing. Cessation counseled. Nicotine replacement PRN.     HyperLipidemia - controlled on home Statin. Continue, w/ f/u and med adjustment w/ PCP     Alcohol Abuse - active and ongoing w/ dependence normally consuming 2 bottles of wine daily w/out signs/sxs of active w/drawal.  Cessation counseled.  Placed on scheduled Valium, low dose.  Started on CIWA w/ PRN Ativan.  Banana bag ordered x 3 days.     Obesity -  With Body mass index is 97.80 kg/m². Complicating assessment and treatment.  Placing patient at risk for multiple co-morbidities as well as early death and contributing to the patient's presentation. Counseled on weight loss.       Labs: For convenience and continuity at follow-up the following most recent labs are provided:      CBC:    Lab Results   Component Value Date    WBC 6.6 03/24/2020    HGB 7.6 03/24/2020    HCT 23.7 03/24/2020     03/24/2020       Renal:    Lab Results   Component Value Date     03/24/2020    K 3.9 03/24/2020    K 4.1 03/22/2020     03/24/2020    CO2 22 03/24/2020    BUN 9 03/24/2020    CREATININE 1.0 03/24/2020    CALCIUM 8.7 03/24/2020    PHOS 5.1 03/24/2020         Significant Diagnostic Studies    Radiology:   XR CHEST STANDARD (2 VW)   Final Result   No acute cardiopulmonary disease. Consults:     IP CONSULT TO HOSPITALIST  IP CONSULT TO GI  IP CONSULT TO GI    Disposition: home     Condition at Discharge: Stable    Discharge Instructions/Follow-up:  w/ PCP 1-2 weeks and subspecialists as arranged. Code Status:  Full Code    Activity: activity as tolerated    Diet: regular diet      Discharge Medications:     Discharge Medication List as of 3/24/2020 12:32 PM           Details   pantoprazole (PROTONIX) 40 MG tablet Take 40 mg by mouth dailyHistorical Med      atorvastatin (LIPITOR) 40 MG tablet TAKE 1 TABLET BY MOUTH DAILY., R-4Historical Med             Time Spent on discharge is more than 30 minutes in the examination, evaluation, counseling and review of medications and discharge plan. Signed:    Aleisha Duenas MD   3/26/2020      Thank you Adal Palma MD for the opportunity to be involved in this patient's care. If you have any questions or concerns please feel free to contact me at [phone].

## 2020-09-06 ENCOUNTER — HOSPITAL ENCOUNTER (OUTPATIENT)
Age: 70
Setting detail: OBSERVATION
Discharge: NURSING, REHAB OR LONG-TERM CARE | End: 2020-09-13
Attending: EMERGENCY MEDICINE | Admitting: FAMILY MEDICINE
Payer: MEDICARE

## 2020-09-06 ENCOUNTER — APPOINTMENT (OUTPATIENT)
Dept: CT IMAGING | Age: 70
End: 2020-09-06
Payer: MEDICARE

## 2020-09-06 ENCOUNTER — APPOINTMENT (OUTPATIENT)
Dept: GENERAL RADIOLOGY | Age: 70
End: 2020-09-06
Payer: MEDICARE

## 2020-09-06 PROBLEM — R53.1 WEAKNESS: Status: ACTIVE | Noted: 2020-09-06

## 2020-09-06 LAB
A/G RATIO: 1.3 (ref 1.1–2.2)
ALBUMIN SERPL-MCNC: 2.8 G/DL (ref 3.4–5)
ALP BLD-CCNC: 113 U/L (ref 40–129)
ALT SERPL-CCNC: 37 U/L (ref 10–40)
ANION GAP SERPL CALCULATED.3IONS-SCNC: 19 MMOL/L (ref 3–16)
AST SERPL-CCNC: 49 U/L (ref 15–37)
BASOPHILS ABSOLUTE: 0.1 K/UL (ref 0–0.2)
BASOPHILS RELATIVE PERCENT: 1.1 %
BILIRUB SERPL-MCNC: 0.4 MG/DL (ref 0–1)
BUN BLDV-MCNC: 12 MG/DL (ref 7–20)
CALCIUM SERPL-MCNC: 8.3 MG/DL (ref 8.3–10.6)
CHLORIDE BLD-SCNC: 101 MMOL/L (ref 99–110)
CO2: 18 MMOL/L (ref 21–32)
CREAT SERPL-MCNC: 0.7 MG/DL (ref 0.6–1.2)
EOSINOPHILS ABSOLUTE: 0 K/UL (ref 0–0.6)
EOSINOPHILS RELATIVE PERCENT: 0.6 %
ETHANOL: 103 MG/DL (ref 0–0.08)
GFR AFRICAN AMERICAN: >60
GFR NON-AFRICAN AMERICAN: >60
GLOBULIN: 2.1 G/DL
GLUCOSE BLD-MCNC: 82 MG/DL (ref 70–99)
HCT VFR BLD CALC: 27.6 % (ref 36–48)
HEMOGLOBIN: 8.9 G/DL (ref 12–16)
LYMPHOCYTES ABSOLUTE: 1.4 K/UL (ref 1–5.1)
LYMPHOCYTES RELATIVE PERCENT: 23.9 %
MCH RBC QN AUTO: 35 PG (ref 26–34)
MCHC RBC AUTO-ENTMCNC: 32.1 G/DL (ref 31–36)
MCV RBC AUTO: 109 FL (ref 80–100)
MONOCYTES ABSOLUTE: 0.5 K/UL (ref 0–1.3)
MONOCYTES RELATIVE PERCENT: 9.2 %
NEUTROPHILS ABSOLUTE: 3.9 K/UL (ref 1.7–7.7)
NEUTROPHILS RELATIVE PERCENT: 65.2 %
PDW BLD-RTO: 19 % (ref 12.4–15.4)
PLATELET # BLD: 238 K/UL (ref 135–450)
PMV BLD AUTO: 8.1 FL (ref 5–10.5)
POTASSIUM SERPL-SCNC: 3.8 MMOL/L (ref 3.5–5.1)
RBC # BLD: 2.54 M/UL (ref 4–5.2)
SODIUM BLD-SCNC: 138 MMOL/L (ref 136–145)
TOTAL PROTEIN: 4.9 G/DL (ref 6.4–8.2)
TROPONIN: <0.01 NG/ML
WBC # BLD: 5.9 K/UL (ref 4–11)

## 2020-09-06 PROCEDURE — 71045 X-RAY EXAM CHEST 1 VIEW: CPT

## 2020-09-06 PROCEDURE — 93005 ELECTROCARDIOGRAM TRACING: CPT | Performed by: EMERGENCY MEDICINE

## 2020-09-06 PROCEDURE — 2580000003 HC RX 258: Performed by: FAMILY MEDICINE

## 2020-09-06 PROCEDURE — 36415 COLL VENOUS BLD VENIPUNCTURE: CPT

## 2020-09-06 PROCEDURE — 80053 COMPREHEN METABOLIC PANEL: CPT

## 2020-09-06 PROCEDURE — 82746 ASSAY OF FOLIC ACID SERUM: CPT

## 2020-09-06 PROCEDURE — 99285 EMERGENCY DEPT VISIT HI MDM: CPT

## 2020-09-06 PROCEDURE — G0378 HOSPITAL OBSERVATION PER HR: HCPCS

## 2020-09-06 PROCEDURE — 85025 COMPLETE CBC W/AUTO DIFF WBC: CPT

## 2020-09-06 PROCEDURE — 2580000003 HC RX 258: Performed by: EMERGENCY MEDICINE

## 2020-09-06 PROCEDURE — G0480 DRUG TEST DEF 1-7 CLASSES: HCPCS

## 2020-09-06 PROCEDURE — 82607 VITAMIN B-12: CPT

## 2020-09-06 PROCEDURE — 84484 ASSAY OF TROPONIN QUANT: CPT

## 2020-09-06 PROCEDURE — 70450 CT HEAD/BRAIN W/O DYE: CPT

## 2020-09-06 PROCEDURE — 96361 HYDRATE IV INFUSION ADD-ON: CPT

## 2020-09-06 RX ORDER — LORAZEPAM 1 MG/1
3 TABLET ORAL
Status: DISCONTINUED | OUTPATIENT
Start: 2020-09-06 | End: 2020-09-10

## 2020-09-06 RX ORDER — MULTIVITAMIN WITH IRON
1 TABLET ORAL DAILY
Status: DISCONTINUED | OUTPATIENT
Start: 2020-09-07 | End: 2020-09-06 | Stop reason: SDUPTHER

## 2020-09-06 RX ORDER — POLYETHYLENE GLYCOL 3350 17 G/17G
17 POWDER, FOR SOLUTION ORAL DAILY PRN
Status: DISCONTINUED | OUTPATIENT
Start: 2020-09-06 | End: 2020-09-13 | Stop reason: HOSPADM

## 2020-09-06 RX ORDER — LORAZEPAM 2 MG/ML
4 INJECTION INTRAMUSCULAR
Status: DISCONTINUED | OUTPATIENT
Start: 2020-09-06 | End: 2020-09-10

## 2020-09-06 RX ORDER — PANTOPRAZOLE SODIUM 40 MG/1
40 TABLET, DELAYED RELEASE ORAL DAILY
Status: DISCONTINUED | OUTPATIENT
Start: 2020-09-07 | End: 2020-09-13 | Stop reason: HOSPADM

## 2020-09-06 RX ORDER — SODIUM CHLORIDE 0.9 % (FLUSH) 0.9 %
10 SYRINGE (ML) INJECTION EVERY 12 HOURS SCHEDULED
Status: DISCONTINUED | OUTPATIENT
Start: 2020-09-06 | End: 2020-09-13 | Stop reason: HOSPADM

## 2020-09-06 RX ORDER — THIAMINE MONONITRATE (VIT B1) 100 MG
100 TABLET ORAL DAILY
Status: DISCONTINUED | OUTPATIENT
Start: 2020-09-07 | End: 2020-09-13 | Stop reason: HOSPADM

## 2020-09-06 RX ORDER — ATENOLOL 25 MG/1
25 TABLET ORAL DAILY
COMMUNITY
Start: 2020-04-04

## 2020-09-06 RX ORDER — ACETAMINOPHEN 650 MG/1
650 SUPPOSITORY RECTAL EVERY 6 HOURS PRN
Status: DISCONTINUED | OUTPATIENT
Start: 2020-09-06 | End: 2020-09-13 | Stop reason: HOSPADM

## 2020-09-06 RX ORDER — SODIUM CHLORIDE 0.9 % (FLUSH) 0.9 %
10 SYRINGE (ML) INJECTION PRN
Status: DISCONTINUED | OUTPATIENT
Start: 2020-09-06 | End: 2020-09-13 | Stop reason: HOSPADM

## 2020-09-06 RX ORDER — LORAZEPAM 1 MG/1
2 TABLET ORAL
Status: DISCONTINUED | OUTPATIENT
Start: 2020-09-06 | End: 2020-09-10

## 2020-09-06 RX ORDER — M-VIT,TX,IRON,MINS/CALC/FOLIC 27MG-0.4MG
1 TABLET ORAL DAILY
Status: DISCONTINUED | OUTPATIENT
Start: 2020-09-07 | End: 2020-09-13 | Stop reason: HOSPADM

## 2020-09-06 RX ORDER — LORAZEPAM 1 MG/1
4 TABLET ORAL
Status: DISCONTINUED | OUTPATIENT
Start: 2020-09-06 | End: 2020-09-10

## 2020-09-06 RX ORDER — FERROUS SULFATE 325(65) MG
325 TABLET ORAL DAILY
COMMUNITY
Start: 2020-04-05

## 2020-09-06 RX ORDER — MULTIVITAMIN WITH IRON
1 TABLET ORAL DAILY
COMMUNITY
Start: 2020-04-04

## 2020-09-06 RX ORDER — LORAZEPAM 1 MG/1
1 TABLET ORAL
Status: DISCONTINUED | OUTPATIENT
Start: 2020-09-06 | End: 2020-09-10

## 2020-09-06 RX ORDER — ATORVASTATIN CALCIUM 40 MG/1
40 TABLET, FILM COATED ORAL DAILY
Status: DISCONTINUED | OUTPATIENT
Start: 2020-09-07 | End: 2020-09-13 | Stop reason: HOSPADM

## 2020-09-06 RX ORDER — PROMETHAZINE HYDROCHLORIDE 25 MG/1
12.5 TABLET ORAL EVERY 6 HOURS PRN
Status: DISCONTINUED | OUTPATIENT
Start: 2020-09-06 | End: 2020-09-13 | Stop reason: HOSPADM

## 2020-09-06 RX ORDER — 0.9 % SODIUM CHLORIDE 0.9 %
1000 INTRAVENOUS SOLUTION INTRAVENOUS ONCE
Status: COMPLETED | OUTPATIENT
Start: 2020-09-06 | End: 2020-09-06

## 2020-09-06 RX ORDER — LORAZEPAM 2 MG/ML
3 INJECTION INTRAMUSCULAR
Status: DISCONTINUED | OUTPATIENT
Start: 2020-09-06 | End: 2020-09-10

## 2020-09-06 RX ORDER — ATENOLOL 25 MG/1
25 TABLET ORAL DAILY
Status: DISCONTINUED | OUTPATIENT
Start: 2020-09-07 | End: 2020-09-09

## 2020-09-06 RX ORDER — FERROUS SULFATE 325(65) MG
325 TABLET ORAL DAILY
Status: DISCONTINUED | OUTPATIENT
Start: 2020-09-07 | End: 2020-09-13 | Stop reason: HOSPADM

## 2020-09-06 RX ORDER — LORAZEPAM 2 MG/ML
2 INJECTION INTRAMUSCULAR
Status: DISCONTINUED | OUTPATIENT
Start: 2020-09-06 | End: 2020-09-10

## 2020-09-06 RX ORDER — SERTRALINE HYDROCHLORIDE 25 MG/1
25 TABLET, FILM COATED ORAL DAILY
COMMUNITY
Start: 2020-04-04

## 2020-09-06 RX ORDER — ACETAMINOPHEN 325 MG/1
650 TABLET ORAL EVERY 6 HOURS PRN
Status: DISCONTINUED | OUTPATIENT
Start: 2020-09-06 | End: 2020-09-13 | Stop reason: HOSPADM

## 2020-09-06 RX ORDER — ONDANSETRON 2 MG/ML
4 INJECTION INTRAMUSCULAR; INTRAVENOUS EVERY 6 HOURS PRN
Status: DISCONTINUED | OUTPATIENT
Start: 2020-09-06 | End: 2020-09-13 | Stop reason: HOSPADM

## 2020-09-06 RX ORDER — SODIUM CHLORIDE 9 MG/ML
INJECTION, SOLUTION INTRAVENOUS CONTINUOUS
Status: DISCONTINUED | OUTPATIENT
Start: 2020-09-06 | End: 2020-09-07

## 2020-09-06 RX ORDER — LORAZEPAM 2 MG/ML
1 INJECTION INTRAMUSCULAR
Status: DISCONTINUED | OUTPATIENT
Start: 2020-09-06 | End: 2020-09-10

## 2020-09-06 RX ADMIN — SODIUM CHLORIDE: 9 INJECTION, SOLUTION INTRAVENOUS at 21:49

## 2020-09-06 RX ADMIN — SODIUM CHLORIDE 1000 ML: 9 INJECTION, SOLUTION INTRAVENOUS at 15:54

## 2020-09-06 RX ADMIN — Medication 10 ML: at 21:50

## 2020-09-06 ASSESSMENT — ENCOUNTER SYMPTOMS
ABDOMINAL PAIN: 0
WHEEZING: 0
SORE THROAT: 0
DIARRHEA: 0
COUGH: 0
VOMITING: 0
TROUBLE SWALLOWING: 0
STRIDOR: 0
SHORTNESS OF BREATH: 0
RHINORRHEA: 0
CHEST TIGHTNESS: 0
NAUSEA: 0

## 2020-09-06 ASSESSMENT — PAIN SCALES - GENERAL
PAINLEVEL_OUTOF10: 0
PAINLEVEL_OUTOF10: 0

## 2020-09-06 NOTE — ED NOTES
Pt ambulated 60 ft in valle to bathroom and back with assist x 2. Pt very unsteady and would fall without assistance.      Kenji Doll RN  09/06/20 4073

## 2020-09-06 NOTE — ED NOTES

## 2020-09-06 NOTE — ED NOTES
[facility] [id number]  Per Dr.Kim JIANG  generalized weakness   @[id number]       Silvina Anderson  09/06/20 3292

## 2020-09-06 NOTE — ED PROVIDER NOTES
dizziness, weakness, light-headedness, numbness and headaches. Except as noted above the remainder of the review of systems was reviewedand negative. PAST MEDICAL HISTORY     Past Medical History:   Diagnosis Date    Arthritis     Cancer ([facility]     skin    COPD (chronic obstructive pulmonary disease) ([facility]     Fatty liver     Fractures     Gastric ulcer     High blood pressure     Hyperlipidemia     Palpitations          SURGICAL HISTORY       Past Surgical History:   Procedure Laterality Date    HYSTERECTOMY  1988    INTRACAPSULAR CATARACT EXTRACTION Left 1/11/2019    PHACOEMULSIFICATION OF CATARACT LEFT EYE WITH INTRAOCULAR LENS IMPLANT performed by Madeline Casey MD at [facility]    UPPER GASTROINTESTINAL ENDOSCOPY N/A 8/13/2019    EGD BIOPSY performed by Rodolfo Bansal MD at [facility] 3/23/2020    EGD DIAGNOSTIC ONLY performed by Rodolfo Bansal MD at [facility] 188       Previous Medications    ATENOLOL (TENORMIN) 25 MG TABLET    Take 25 mg by mouth daily    ATORVASTATIN (LIPITOR) 40 MG TABLET    TAKE 1 TABLET BY MOUTH DAILY.     FERROUS SULFATE (IRON 325) 325 (65 FE) MG TABLET    Take 325 mg by mouth daily    MULTIPLE VITAMIN (MULTIVITAMIN) TABS TABLET    Take 1 tablet by mouth daily    PANTOPRAZOLE (PROTONIX) 40 MG TABLET    Take 40 mg by mouth daily    SERTRALINE (ZOLOFT) 25 MG TABLET    Take 25 mg by mouth daily       ALLERGIES     Lisinopril and Sulphadimidine [sulfamethazine]    FAMILY HISTORY       Family History   Problem Relation Age of Onset    Osteoarthritis Mother     Cancer Paternal Grandmother           SOCIAL HISTORY       Social History     Socioeconomic History    Marital status:      Spouse name: None    Number of children: None    Years of education: None    Highest education level: None Occupational History    None   Social Needs    Financial resource strain: None    Food insecurity     Worry: None     Inability: None    Transportation needs     Medical: None     Non-medical: None   Tobacco Use    Smoking status: Current Every Day Smoker     Packs/day: 1.00    Smokeless tobacco: Never Used   Substance and Sexual Activity    Alcohol use: Yes     Comment: 2 bottles of wine per week    Drug use: No    Sexual activity: None   Lifestyle    Physical activity     Days per week: None     Minutes per session: None    Stress: None   Relationships    Social connections     Talks on phone: None     Gets together: None     Attends Restorationism service: None     Active member of club or organization: None     Attends meetings of clubs or organizations: None     Relationship status: None    Intimate partner violence     Fear of current or ex partner: None     Emotionally abused: None     Physically abused: None     Forced sexual activity: None   Other Topics Concern    None   Social History Narrative    None       SCREENINGS    [place] Coma Scale  Eye Opening: Spontaneous  Best Verbal Response: Oriented  Best Motor Response: Obeys commands  [place] Coma Scale Score: 15        PHYSICAL EXAM    (up to 7 for level 4, 8 ormore for level 5)     ED Triage Vitals   BP Temp Temp Source Pulse Resp SpO2 Height Weight   09/06/20 1512 09/06/20 1512 09/06/20 1512 09/06/20 1514 09/06/20 1512 09/06/20 1514 -- 09/06/20 1512   122/72 98.3 °F (36.8 °C) Oral 78 18 100 %  180 lb (81.6 kg)       Physical Exam  Constitutional:       General: She is not in acute distress. Appearance: Normal appearance. She is well-developed. She is not ill-appearing or toxic-appearing. Comments: Sitting in bed comfortably, speaking in full sentences, following verbal commands appropriately. Not in acute distress     HENT:      Head: Normocephalic and atraumatic.    Eyes:      Conjunctiva/sclera: Conjunctivae normal.      Pupils: Pupils are equal, round, and reactive to light. Neck:      Musculoskeletal: Normal range of motion and neck supple. Cardiovascular:      Rate and Rhythm: Normal rate and regular rhythm. Heart sounds: Normal heart sounds. No murmur. No friction rub. No gallop. Pulmonary:      Effort: Pulmonary effort is normal. No respiratory distress. Breath sounds: Normal breath sounds. No decreased breath sounds, wheezing, rhonchi or rales. Abdominal:      General: Bowel sounds are normal. There is no distension. Palpations: Abdomen is soft. Tenderness: There is no abdominal tenderness. There is no guarding or rebound. Musculoskeletal: Normal range of motion. General: No tenderness or deformity. Skin:     General: Skin is warm and dry. Findings: No rash. Neurological:      General: No focal deficit present. Mental Status: She is alert and oriented to person, place, and time. GCS: GCS eye subscore is 4. GCS verbal subscore is 5. GCS motor subscore is 6. Motor: Motor function is intact. Psychiatric:         Behavior: Behavior is cooperative. DIAGNOSTIC RESULTS     EKG: All EKG's are interpreted by the Emergency Department Physicianwho either signs or Co-signs this chart in the absence of a cardiologist.    The Ekg interpreted by me shows  normal sinus rhythm with a rate of 80  Axis is   Normal  QTc is  465  Intervals and Durations are unremarkable. ST Segments: no acute change  No significant change from prior EKG dated 3/21/20    RADIOLOGY:   Non-plain film images such as CT, Ultrasound and MRI are read by the radiologist. Plain radiographic images are visualized and preliminarily interpreted by the emergency physician with the below findings:      Interpretation per the Radiologist below, if available at the time of this note:    CT HEAD WO CONTRAST   Final Result   No acute intracranial abnormality.          XR CHEST PORTABLE   Final Result   No acute process. ED BEDSIDE ULTRASOUND:   Performed by ED Physician - none    LABS:  Labs Reviewed   CBC WITH AUTO DIFFERENTIAL - Abnormal; Notable for the following components:       Result Value    RBC 2.54 (*)     Hemoglobin 8.9 (*)     Hematocrit 27.6 (*)     .0 (*)     MCH 35.0 (*)     RDW 19.0 (*)     All other components within normal limits    Narrative:     Performed at:  [address], [facility] [email or web address]   Phone [phone]   COMPREHENSIVE METABOLIC PANEL - Abnormal; Notable for the following components:    CO2 18 (*)     Anion Gap 19 (*)     Total Protein 4.9 (*)     Alb 2.8 (*)     AST 49 (*)     All other components within normal limits    Narrative:     Performed at:  [place] [address] [email or web address]   Phone [phone]   TROPONIN    Narrative:     Performed at:  [place] [address] [email or web address]   Phone [phone]   ETHANOL    Narrative:     Performed at:  [place] [address] [email or web address]   Phone [phone]   URINE RT REFLEX TO CULTURE       All other labs were within normal range ornot returned as of this dictation. EMERGENCY DEPARTMENT COURSE and DIFFERENTIAL DIAGNOSIS/MDM:   Vitals:    Vitals:    09/06/20 1514 09/06/20 1607 09/06/20 1632 09/06/20 1815   BP:  115/71  (!) 141/77   Pulse: 78 79  76   Resp:  16  16   Temp:       TempSrc:       SpO2: 100% 100%  98%   Weight:       Height:   5' 5\" (1.651 m)          MDM    ED COURSE/MDM    -Zulma Thayer is a 77 y.o. female with a history of attention, hyperlipidemia, goal abuse presents to ED for fall and generalized fatigue.   Upon arrival patient was afebrile stable vitals, speaking full sentences not in acute distress moving all 4 extremities no focal deficit.  -Lab work was significant for mildly elevated anion gap of 19, negative leukocytosis, H&H stable at 8.9/27.6. Ethanol level of 103  -X-ray shows no acute process  -CT head: No acute intracranial abnormality  -Started on 1 L IV fluid bolus  -Ambulated patient, nurses informs me that she is very unsteady on her feet and would have fallen without assistance. This is likely a combination of patient's upper intoxication as well as the generalized fatigue and leg weakness. Rest patient lives by herself without any assistance, do not feel that it is safe for discharge. Will admit for further evaluation and treatment and she is in agreement with plan and have nofurther questions/concerns      REASSESSMENT      Well appearing, non toxic, alert, oriented speaking in full sentences and hemodynamically stable upon admission      CRITICAL CARE TIME   Total Critical Care time was 0 minutes, excluding separately reportableprocedures. There was a high probability of clinicallysignificant/life threatening deterioration in the patient's condition which required my urgent intervention. CONSULTS:  IP CONSULT TO HOSPITALIST  IP CONSULT TO SOCIAL WORK    PROCEDURES:  Unless otherwise noted below, none     Procedures    FINAL IMPRESSION      1. Other fatigue    2.  Acute alcoholic intoxication without complication [facility])          DISPOSITION/PLAN   DISPOSITION        PATIENT REFERREDTO:  Orly Rosa MD  [address] [facility] [address]  [phone]            DISCHARGE MEDICATIONS:  New Prescriptions    No medications on file          (Please note that portions of this note were completed with a voice recognition program.  Efforts were made to edit the dictations but occasionally wordsare mis-transcribed.)    Dean Churchill MD (electronically signed)  Attending Emergency Physician           Dean Churchill MD  09/06/20 9255

## 2020-09-06 NOTE — ED NOTES

## 2020-09-06 NOTE — H&P
Hospital Medicine History & Physical      PCP: Daniela Owen MD    Date of Admission: 9/6/2020    Date of Service: Pt seen/examined on 9/6/2020 and  Placed in Observation. Chief Complaint:  Weakness, wobbly       History Of Present Illness:      77 y.o. female with PMH of alcohol abuse , HTN , GERD presents with c/o wekaness and being wobbly on  Her feet . Pt reports daily alcohol abuse , reports poor PPO intake   C/o worsening gen weakness for the last few months   Had a fall last week , reports hitting her head then , didn't seek med care then   Pt had worsening gen weakness and had a mechanical fall again today , ehr feet just could not carry her  C/o mild numbness in her feet   Denies any neck pain , back pain , headache , fever, chest pain , cough , sob       Past Medical History:          Diagnosis Date    Arthritis     Cancer ([facility] [address]     skin    COPD (chronic obstructive pulmonary disease) ([facility]     Fatty liver     Fractures     Gastric ulcer     High blood pressure     Hyperlipidemia     Palpitations        Past Surgical History:          Procedure Laterality Date    HYSTERECTOMY  1988    INTRACAPSULAR CATARACT EXTRACTION Left 1/11/2019    PHACOEMULSIFICATION OF CATARACT LEFT EYE WITH INTRAOCULAR LENS IMPLANT performed by Tricia Gilliland MD at [place] [address]    UPPER GASTROINTESTINAL ENDOSCOPY N/A 8/13/2019    EGD BIOPSY performed by Ethan Adams MD at [place] [address] N/A 3/23/2020    EGD DIAGNOSTIC ONLY performed by Ethan Adams MD at [address] [place] [address]       Medications Prior to Admission:      Prior to Admission medications    Medication Sig Start Date End Date Taking?  Authorizing Provider   atenolol (TENORMIN) 25 MG tablet Take 25 mg by mouth daily 4/4/20  Yes Historical Provider, MD   ferrous sulfate (IRON 325) 325 (65 Fe) MG tablet Take 325 mg by non-tender, non-distended with normal bowel sounds. Musculoskeletal:  No clubbing, cyanosis or edema bilaterally. Full range of motion without deformity. Skin: Skin color, texture, turgor normal.  No rashes or lesions. Neurologic:  Neurovascularly intact without any focal sensory/motor deficits. Cranial nerves: II-XII intact, grossly non-focal.  Psychiatric:  Alert and oriented, thought content appropriate, normal insight  Capillary Refill: Brisk,< 3 seconds   Peripheral Pulses: +2 palpable, equal bilaterally       Labs:     Recent Labs     09/06/20  1517   WBC 5.9   HGB 8.9*   HCT 27.6*        Recent Labs     09/06/20  1517      K 3.8      CO2 18*   BUN 12   CREATININE 0.7   CALCIUM 8.3     Recent Labs     09/06/20  1517   AST 49*   ALT 37   BILITOT 0.4   ALKPHOS 113     No results for input(s): INR in the last 72 hours. Recent Labs     09/06/20  1517   TROPONINI <0.01       Urinalysis:      Lab Results   Component Value Date    NITRU Negative 03/21/2020    WBCUA 0-2 03/21/2020    BACTERIA 2+ 03/21/2020    RBCUA 0-2 03/21/2020    BLOODU SMALL 03/21/2020    SPECGRAV 1.015 03/21/2020    GLUCOSEU Negative 03/21/2020       Radiology:     CXR: I have reviewed the CXR with the following interpretation: no ac changes   EKG:  I have reviewed the EKG with the following interpretation: no ac changes     CT HEAD WO CONTRAST   Final Result   No acute intracranial abnormality. XR CHEST PORTABLE   Final Result   No acute process.              ASSESSMENT:    Active Hospital Problems    Diagnosis Date Noted    Weakness [R53.1] 09/06/2020         PLAN:    Generalized weakness  Unable to ambulate  Alcohol abuse    CT head is neg for ac changes   cont CIWA protocol with prn ativan , cont vit supplements   Get PT/OT eval     HTN cont atenolol    Depression - cont sertraline     anemie - monitor     DVT Prophylaxis: lovenox   Diet: No diet orders on file  Code Status: Prior    PT/OT Eval Status:    Dispo

## 2020-09-06 NOTE — ED NOTES
Pt scores as fall risk. Pt non skid socks on, fall band on and bed alarm on. Fall sign outside pt room and call light within reach. Pt alert and oriented.       Audie Russell RN  09/06/20 0564

## 2020-09-06 NOTE — ED NOTES
Bed: 11  Expected date:   Expected time:   Means of arrival:   Comments:  damon Ferreira RN  09/06/20 9082

## 2020-09-07 LAB
A/G RATIO: 1.3 (ref 1.1–2.2)
ALBUMIN SERPL-MCNC: 2.8 G/DL (ref 3.4–5)
ALP BLD-CCNC: 126 U/L (ref 40–129)
ALT SERPL-CCNC: 36 U/L (ref 10–40)
ANION GAP SERPL CALCULATED.3IONS-SCNC: 13 MMOL/L (ref 3–16)
AST SERPL-CCNC: 50 U/L (ref 15–37)
BASOPHILS ABSOLUTE: 0 K/UL (ref 0–0.2)
BASOPHILS RELATIVE PERCENT: 0.9 %
BILIRUB SERPL-MCNC: 0.5 MG/DL (ref 0–1)
BILIRUBIN URINE: NEGATIVE
BLOOD, URINE: NEGATIVE
BUN BLDV-MCNC: 10 MG/DL (ref 7–20)
CALCIUM SERPL-MCNC: 8.1 MG/DL (ref 8.3–10.6)
CHLORIDE BLD-SCNC: 103 MMOL/L (ref 99–110)
CLARITY: CLEAR
CO2: 23 MMOL/L (ref 21–32)
COLOR: YELLOW
CREAT SERPL-MCNC: 0.7 MG/DL (ref 0.6–1.2)
EKG ATRIAL RATE: 80 BPM
EKG DIAGNOSIS: NORMAL
EKG P AXIS: 57 DEGREES
EKG P-R INTERVAL: 190 MS
EKG Q-T INTERVAL: 396 MS
EKG QRS DURATION: 72 MS
EKG QTC CALCULATION (BAZETT): 456 MS
EKG R AXIS: 34 DEGREES
EKG T AXIS: 27 DEGREES
EKG VENTRICULAR RATE: 80 BPM
EOSINOPHILS ABSOLUTE: 0 K/UL (ref 0–0.6)
EOSINOPHILS RELATIVE PERCENT: 0.8 %
GFR AFRICAN AMERICAN: >60
GFR NON-AFRICAN AMERICAN: >60
GLOBULIN: 2.2 G/DL
GLUCOSE BLD-MCNC: 107 MG/DL (ref 70–99)
GLUCOSE URINE: NEGATIVE MG/DL
HCT VFR BLD CALC: 27.4 % (ref 36–48)
HEMOGLOBIN: 8.9 G/DL (ref 12–16)
KETONES, URINE: NEGATIVE MG/DL
LEUKOCYTE ESTERASE, URINE: NEGATIVE
LYMPHOCYTES ABSOLUTE: 1 K/UL (ref 1–5.1)
LYMPHOCYTES RELATIVE PERCENT: 22.6 %
MCH RBC QN AUTO: 34.8 PG (ref 26–34)
MCHC RBC AUTO-ENTMCNC: 32.5 G/DL (ref 31–36)
MCV RBC AUTO: 106.9 FL (ref 80–100)
MICROSCOPIC EXAMINATION: NORMAL
MONOCYTES ABSOLUTE: 0.4 K/UL (ref 0–1.3)
MONOCYTES RELATIVE PERCENT: 8.5 %
NEUTROPHILS ABSOLUTE: 2.9 K/UL (ref 1.7–7.7)
NEUTROPHILS RELATIVE PERCENT: 67.2 %
NITRITE, URINE: NEGATIVE
PDW BLD-RTO: 19 % (ref 12.4–15.4)
PH UA: 6 (ref 5–8)
PLATELET # BLD: 236 K/UL (ref 135–450)
PMV BLD AUTO: 8.1 FL (ref 5–10.5)
POTASSIUM REFLEX MAGNESIUM: 3.8 MMOL/L (ref 3.5–5.1)
PROTEIN UA: NEGATIVE MG/DL
RBC # BLD: 2.56 M/UL (ref 4–5.2)
SODIUM BLD-SCNC: 139 MMOL/L (ref 136–145)
SPECIFIC GRAVITY UA: 1.01 (ref 1–1.03)
TOTAL PROTEIN: 5 G/DL (ref 6.4–8.2)
URINE TYPE: NORMAL
UROBILINOGEN, URINE: 0.2 E.U./DL
WBC # BLD: 4.4 K/UL (ref 4–11)

## 2020-09-07 PROCEDURE — 97116 GAIT TRAINING THERAPY: CPT

## 2020-09-07 PROCEDURE — 2580000003 HC RX 258: Performed by: FAMILY MEDICINE

## 2020-09-07 PROCEDURE — 97166 OT EVAL MOD COMPLEX 45 MIN: CPT

## 2020-09-07 PROCEDURE — 81003 URINALYSIS AUTO W/O SCOPE: CPT

## 2020-09-07 PROCEDURE — 85025 COMPLETE CBC W/AUTO DIFF WBC: CPT

## 2020-09-07 PROCEDURE — 97162 PT EVAL MOD COMPLEX 30 MIN: CPT

## 2020-09-07 PROCEDURE — 6360000002 HC RX W HCPCS: Performed by: FAMILY MEDICINE

## 2020-09-07 PROCEDURE — G0378 HOSPITAL OBSERVATION PER HR: HCPCS

## 2020-09-07 PROCEDURE — 96372 THER/PROPH/DIAG INJ SC/IM: CPT

## 2020-09-07 PROCEDURE — 6370000000 HC RX 637 (ALT 250 FOR IP): Performed by: FAMILY MEDICINE

## 2020-09-07 PROCEDURE — 97530 THERAPEUTIC ACTIVITIES: CPT

## 2020-09-07 PROCEDURE — 36415 COLL VENOUS BLD VENIPUNCTURE: CPT

## 2020-09-07 PROCEDURE — 93010 ELECTROCARDIOGRAM REPORT: CPT | Performed by: INTERNAL MEDICINE

## 2020-09-07 PROCEDURE — 80053 COMPREHEN METABOLIC PANEL: CPT

## 2020-09-07 PROCEDURE — 6370000000 HC RX 637 (ALT 250 FOR IP): Performed by: NURSE PRACTITIONER

## 2020-09-07 RX ORDER — CHLORDIAZEPOXIDE HYDROCHLORIDE 25 MG/1
50 CAPSULE, GELATIN COATED ORAL 4 TIMES DAILY
Status: DISCONTINUED | OUTPATIENT
Start: 2020-09-07 | End: 2020-09-08

## 2020-09-07 RX ADMIN — CHLORDIAZEPOXIDE HYDROCHLORIDE 50 MG: 25 CAPSULE ORAL at 17:03

## 2020-09-07 RX ADMIN — PANTOPRAZOLE SODIUM 40 MG: 40 TABLET, DELAYED RELEASE ORAL at 08:19

## 2020-09-07 RX ADMIN — SERTRALINE HYDROCHLORIDE 25 MG: 50 TABLET, FILM COATED ORAL at 08:19

## 2020-09-07 RX ADMIN — Medication 100 MG: at 08:19

## 2020-09-07 RX ADMIN — FERROUS SULFATE TAB 325 MG (65 MG ELEMENTAL FE) 325 MG: 325 (65 FE) TAB at 09:13

## 2020-09-07 RX ADMIN — ENOXAPARIN SODIUM 40 MG: 40 INJECTION SUBCUTANEOUS at 08:18

## 2020-09-07 RX ADMIN — Medication 10 ML: at 20:38

## 2020-09-07 RX ADMIN — ATORVASTATIN CALCIUM 40 MG: 40 TABLET, FILM COATED ORAL at 08:19

## 2020-09-07 RX ADMIN — CHLORDIAZEPOXIDE HYDROCHLORIDE 50 MG: 25 CAPSULE ORAL at 13:12

## 2020-09-07 RX ADMIN — ATENOLOL 25 MG: 25 TABLET ORAL at 08:19

## 2020-09-07 RX ADMIN — CHLORDIAZEPOXIDE HYDROCHLORIDE 50 MG: 25 CAPSULE ORAL at 20:35

## 2020-09-07 RX ADMIN — ACETAMINOPHEN 650 MG: 325 TABLET ORAL at 01:17

## 2020-09-07 RX ADMIN — Medication 10 ML: at 20:36

## 2020-09-07 RX ADMIN — Medication 1 TABLET: at 08:19

## 2020-09-07 ASSESSMENT — PAIN SCALES - GENERAL
PAINLEVEL_OUTOF10: 0
PAINLEVEL_OUTOF10: 2
PAINLEVEL_OUTOF10: 0

## 2020-09-07 NOTE — PROGRESS NOTES
Patient admitted to room 365 from ED. Patient oriented to room, call light, bed rails, phone, lights, & bathroom. Patient instructed about the schedule of the day including: VS frequency, lab draws, possible tests, frequency of MD & staff rounds. Pt instructed about prescribed diet, how/when to call for meal service & television. Telemetry box in place, patient aware of placement & reason. Bed locked, in lowest position, side rails up 2/4, call light within reach. /70   Pulse 76   Temp 98.4 °F (36.9 °C) (Oral)   Resp 17   Ht 5' 5\" (1.651 m)   Wt 180 lb (81.6 kg)   SpO2 100%   BMI 29.95 kg/m²       4 Eyes Skin Assessment     The patient is being assess for  Admission    I agree that 2 RN's have performed a thorough Head to Toe Skin Assessment on the patient. ALL assessment sites listed below have been assessed. Areas assessed by both nurses:   [x]   Head, Face, and Ears   [x]   Shoulders, Back, and Chest  [x]   Arms, Elbows, and Hands   [x]   Coccyx, Sacrum, and IschIum  [x]   Legs, Feet, and Heels        Does the Patient have Skin Breakdown?   No         [place] Prevention initiated:  Yes   Wound Care Orders initiated:  No      WOC nurse consulted for Pressure Injury (Stage 3,4, Unstageable, DTI, NWPT, and Complex wounds), New and Established Ostomies:  No      Nurse 1 eSignature: Electronically signed by Mervat Lira RN on 9/6/20 at 10:06 PM EDT    **SHARE this note so that the co-signing nurse is able to place an eSignature**    Nurse 2 eSignature: Electronically signed by Isabella Shane RN on 9/9/20 at 9:27 AM EDT

## 2020-09-07 NOTE — PROGRESS NOTES
this visit. has a past medical history of Arthritis, Cancer ([facility] [address] COPD (chronic obstructive pulmonary disease) ([facility] [facility] Fatty liver, Fractures, Gastric ulcer, High blood pressure, Hyperlipidemia, and Palpitations. has a past surgical history that includes Hysterectomy (1988); Tubal ligation (1986); Skin cancer excision; Tonsillectomy and adenoidectomy; Intracapsular cataract extraction (Left, 1/11/2019); Upper gastrointestinal endoscopy (N/A, 8/13/2019); and Upper gastrointestinal endoscopy (N/A, 3/23/2020).            Restrictions  Restrictions/Precautions  Restrictions/Precautions: Up as Tolerated    Subjective      Pain Assessment  Pain Assessment: 0-10  Pain Level: 0  Vital Signs  Temp: 97.6 °F (36.4 °C)  Temp Source: Oral  Pulse: 73  Heart Rate Source: Monitor  Resp: 16  BP: 100/64  BP Location: Right upper arm  Patient Position: Semi fowlers  Level of Consciousness: Alert  MEWS Score: 2  Oxygen Therapy  SpO2: 100 %  Pulse Oximeter Device Mode: Intermittent  Pulse Oximeter Device Location: Right;Finger  O2 Device: None (Room air)  Social/Functional History  Social/Functional History  Lives With: Alone(some hlep from friends if needed)  Type of Home: House  Home Layout: Two level, 1/2 bath on main level, Laundry in basement(12 steps up to 2nd floor and 12 steps to basement where laundry is located)  Home Access: Stairs to enter without rails  Entrance Stairs - Number of Steps: 1 step to enter  Bathroom Shower/Tub: Tub/Shower unit  Bathroom Toilet: Standard  ADL Assistance: Independent  Homemaking Assistance: Independent  Homemaking Responsibilities: Yes  Ambulation Assistance: Independent  Transfer Assistance: Independent  Active : Yes  Mode of Transportation: Car  Occupation: Retired  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;   Leisure & Hobbies: Patient enjoys spending time with friends and relaxing       Objective   Vision: Impaired  Vision Exceptions: Wears glasses at all times  Hearing: Within functional limits    Orientation  Overall Orientation Status: Within Normal Limits     Balance  Sitting Balance: Supervision  Standing Balance: Minimal assistance  Standing Balance  Time: 2-3 minutes  Activity: walking to and from door x2 with and without walker  Comment: Patient completed functional mobility to and from door with date initially without device and then with RW; however even with device did not see a change in her mobility status and balance  ADL  Feeding: Minimal assistance(patient having difficulty opening packages with hands; uses teeth a lot and tremors noted most likely from withdrawal)  LE Dressing: Supervision(for donning/doffing socks sitting EOB)  Toileting: (patient reporting just having used the bathroom; will continue to assess)  Quality of Movement Other  Comment: patient noted to have some tremors in BUE; most likely from some withdrawal will continue to assess     Bed mobility  Supine to Sit: Stand by assistance  Sit to Supine: Stand by assistance  Scooting: Stand by assistance  Transfers  Sit to stand: Contact guard assistance  Stand to sit: Contact guard assistance     Cognition  Overall Cognitive Status: Exceptions  Arousal/Alertness: Appropriate responses to stimuli  Following Commands:  Follows all commands without difficulty  Attention Span: Appears intact  Memory: Appears intact  Safety Judgement: Decreased awareness of need for assistance;Decreased awareness of need for safety  Problem Solving: Assistance required to generate solutions  Insights: Decreased awareness of deficits  Initiation: Requires cues for some  Sequencing: Requires cues for some                 LUE AROM (degrees)  LUE AROM : WNL  RUE AROM (degrees)  RUE AROM : WNL  LUE Strength  Gross LUE Strength: WFL  RUE Strength  Gross RUE Strength: WFL                   Plan   Plan  Times per week: 3-5x per week  Current Treatment Recommendations: Strengthening, Balance Training, Functional Mobility Training, Safety Education & Training, Self-Care / ADL, Equipment Evaluation, Education, & procurement             AM-PAC Score        AM-PAC Inpatient Daily Activity Raw Score: 17 (09/07/20 0938)  AM-PAC Inpatient ADL T-Scale Score : 37.26 (09/07/20 6384)  ADL Inpatient CMS 0-100% Score: 50.11 (09/07/20 5037)  ADL Inpatient CMS G-Code Modifier : CK (09/07/20 1349)    Goals  Short term goals  Time Frame for Short term goals: one week unless otherwise specified 9/14  Short term goal 1: Patient will complete toilet transfer with CGA and use of RW. Short term goal 2: Patient will complete standing grooming tasks with CGA. Short term goal 3: Patient will complete LB dressing with supervision. Short term goal 4: Patient will complete bathing assessment and upgrade.   Patient Goals   Patient goals : \"to get stronger\"       Therapy Time   Individual Concurrent Group Co-treatment   Time In 0819         Time Out 0844         Minutes 25         Timed Code Treatment Minutes: 15 Minutes(10 minute eval)       Maurine Brunner, OT

## 2020-09-07 NOTE — PROGRESS NOTES
Physical Therapy    Facility/Department: [facility] B3 - MED SURG  Initial Assessment/Treatment    NAME: Stan Dale  : 1950  MRN: [id number]    Date of Service: 2020    Discharge Recommendations:  IP Rehab   PT Equipment Recommendations  Other: Defer to nextt level of care    Assessment   Body structures, Functions, Activity limitations: Decreased functional mobility ; Decreased balance;Decreased strength;Decreased safe awareness;Decreased endurance  Assessment: Pt is a 77 y.o. female admitted to [facility] secondary to fall and weakness. Pt reports frequent falls at home. Pt lives alone in two story home with 1 SAVANNA and full flight to main bed/bath. Pt reports she was I with functional mobility and gait PTA. Pt is currently functioning below her baseline completing bed mobility with SBA, t/f with RW and CGA and ambulates up to 100' with RW with CGA to Baldo. Pt is moderately unsteady with gait with multiple LOB requiring up to Baldo to correct. Pt also demosntrates poor safety awareness and poor awareness of deficits. Pt will benefit from continued skilled PT in acute care setting to address above deficits. Recommend IPR at d/c at this time d/t current deficits, limited available assist at home, multiple stairs and poor safety awareness. Treatment Diagnosis: Impaired balance and gait  Specific instructions for Next Treatment: Progress mobility as tolerated  Prognosis: Fair  Decision Making: Medium Complexity  PT Education: Goals; General Safety;Gait Training;PT Role;Disease Specific Education;Plan of Care; Functional Mobility Training; Injury Prevention;Transfer Training;Precautions  Patient Education: Safe use of AD, importance of OOB mobility.  Pt verbalized understanding, will requiring reinforcement secondary to cognition  Barriers to Learning: Cognition, poor safety awareness  REQUIRES PT FOLLOW UP: Yes  Activity Tolerance  Activity Tolerance: Patient Tolerated treatment well;Patient limited by fatigue;Patient limited by endurance; Patient limited by cognitive status       Patient Diagnosis(es): The primary encounter diagnosis was Other fatigue. A diagnosis of Acute alcoholic intoxication without complication (HCC) was also pertinent to this visit. has a past medical history of Arthritis, Cancer ([facility] [address] COPD (chronic obstructive pulmonary disease) ([facility] [address] Fatty liver, Fractures, Gastric ulcer, High blood pressure, Hyperlipidemia, and Palpitations. has a past surgical history that includes Hysterectomy (1988); Tubal ligation (1986); Skin cancer excision; Tonsillectomy and adenoidectomy; Intracapsular cataract extraction (Left, 1/11/2019); Upper gastrointestinal endoscopy (N/A, 8/13/2019); and Upper gastrointestinal endoscopy (N/A, 3/23/2020).     Restrictions  Restrictions/Precautions  Restrictions/Precautions: Up as Tolerated, Seizure, Fall Risk  Position Activity Restriction  Other position/activity restrictions: Telemetry, up with assist  Vision/Hearing  Vision: Impaired  Vision Exceptions: Wears glasses at all times  Hearing: Within functional limits     Subjective  General  Chart Reviewed: Yes  Patient assessed for rehabilitation services?: Yes  Additional Pertinent Hx: Hx alcohol abuse; CT head (-)  Response To Previous Treatment: Not applicable  Family / Caregiver Present: No  Referring Practitioner: Max Pollock MD  Referral Date : 09/07/20  Diagnosis: Weakness, fall  Follows Commands: Within Functional Limits  General Comment  Comments: RN cleared pt for session  Subjective  Subjective: Pt resting in bed on approach, reports fatigue but agreeable to PT evaluation  Pain Screening  Patient Currently in Pain: Denies  Vital Signs  Patient Currently in Pain: Denies       Orientation  Orientation  Overall Orientation Status: Within Normal Limits  Social/Functional History  Social/Functional History  Lives With: Alone(some hlep from friends if needed)  Type of Home: House  Home Layout: Two level, 1/2 bath on main level, Laundry in basement(12 steps up to 2nd floor and 12 steps to basement where laundry is located)  Home Access: Stairs to enter without rails  Entrance Stairs - Number of Steps: 1 SAVANNA, 12-13 steps to second floor with RHR  Bathroom Shower/Tub: Tub/Shower unit  Bathroom Toilet: Standard  Home Equipment: (No DME, reports she is planning to borrow RW from family/friend)  ADL Assistance: Independent  Homemaking Assistance: Independent  Homemaking Responsibilities: Yes  Ambulation Assistance: Independent(Without AD)  Transfer Assistance: Independent  Active : Yes  Mode of Transportation: Car  Occupation: Retired  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;   [address]: Patient enjoys spending time with friends and relaxing  Additional Comments: Pt reports approx 10 falls over last 3 months  Cognition   Cognition  Overall Cognitive Status: Exceptions  Arousal/Alertness: Appropriate responses to stimuli  Following Commands: Follows one step commands with increased time; Follows one step commands with repetition  Attention Span: Attends with cues to redirect  Memory: Appears intact  Safety Judgement: Decreased awareness of need for assistance;Decreased awareness of need for safety  Problem Solving: Assistance required to generate solutions  Insights: Decreased awareness of deficits  Initiation: Requires cues for some  Sequencing: Requires cues for some    Objective     Observation/Palpation  Posture: Fair    AROM RLE (degrees)  RLE AROM: WFL  AROM LLE (degrees)  LLE AROM : WFL     Strength RLE  Comment: Grossly 4/5  Strength LLE  Comment: Grossly 4/5     Tone RLE  RLE Tone: Normotonic  Tone LLE  LLE Tone: Normotonic     Sensation  Overall Sensation Status: WFL(Pt denies N/T to B hands/feet, reports hand feel \"cold\")     Bed mobility  Supine to Sit: Stand by assistance  Sit to Supine: Stand by assistance  Scooting: Stand by assistance  Comment: Completed with HOB elevated and use of BR     Transfers  Sit to Stand: Contact guard assistance  Stand to sit: Contact guard assistance  Comment: EOB to RW, commode to RW, standard chair to RW, cues for safety, cues for hand placement  Ambulation  Ambulation?: Yes  Ambulation 1  Surface: level tile  Device: Rolling Walker  Assistance: Contact guard assistance;Minimal assistance  Quality of Gait: Narrow ALONDRA, B increased hip flexion, intermittent scissoring pattern, increased step length B. Pt moderately unsteady with multiple minor LOB with up to Baldo to correct. Gait Deviations: Slow Ting  Distance: 15' + 100' x 2  Comments: Pt requires cues for safety and cues to maintain RW within safe distance to ALONDRA  Stairs/Curb  Stairs?: No(Deferred d/t safety)        Balance  Posture: Fair  Sitting - Static: Good  Sitting - Dynamic: Good  Standing - Static: Fair;+  Standing - Dynamic: Fair  Comments: CGA with RW        Plan   Plan  Times per week: 3-5x/wk  Times per day: Daily  Specific instructions for Next Treatment: Progress mobility as tolerated  Current Treatment Recommendations: Strengthening, Neuromuscular Re-education, Home Exercise Program, ROM, Safety Education & Training, Balance Training, Endurance Training, Patient/Caregiver Education & Training, Functional Mobility Training, Transfer Training, Gait Training, Stair training  Safety Devices  Type of devices:  All fall risk precautions in place, Bed alarm in place, Call light within reach, Nurse notified, Gait belt, Patient at risk for falls, Left in bed  Restraints  Initially in place: No    AM-PAC Score  AM-PAC Inpatient Mobility Raw Score : 19 (09/07/20 1614)  AM-PAC Inpatient T-Scale Score : 45.44 (09/07/20 1614)  Mobility Inpatient CMS 0-100% Score: 41.77 (09/07/20 1614)  Mobility Inpatient CMS G-Code Modifier : CK (09/07/20 1614)          Goals  Short term goals  Time Frame for Short term goals: 1 week 9/14/20 (unless otherwise specified0  Short term goal 1: Pt will complete supine to/from sit with I  Short term goal 2: Pt will complete sit to/from stand with LRAD with MI  Short term goal 3: Pt will ambulate >80' with LRAD with MI without LOB  Short term goal 4: Pt will ascend/descend 12 steps with UHR with MI without LOB  Short term goal 5: 9/11/20: Pt will participate in 12-15 reps BLE exercises to increase strength and increase I with functional mobility  Patient Goals   Patient goals : \"Go home\"       Therapy Time   Individual Concurrent Group Co-treatment   Time In 1440         Time Out 1458         Minutes 18         Timed Code Treatment Minutes: 8 Minutes(10 minutes for eval)     If pt is unable to be seen after this session, please let this note serve as discharge summary. Please see case management note for discharge disposition. Thank you.     Pieter Diaz, PT, DPT

## 2020-09-07 NOTE — PROGRESS NOTES
Hospitalist Progress Note      PCP: Maddy Donaldson MD    Date of Admission: 9/6/2020    Chief Complaint: weakness, wobbly    Hospital Course:  77year old female with a PMH of alcohol abuse, HTN, and GERD, who presented to [facility] with complaints of weakness and frequent falls. Subjective: Still feels weak. Complains of numbness/tingling of bilateral hands and feet. Medications:  Reviewed    Infusion Medications    sodium chloride 100 mL/hr at 09/06/20 2149     Scheduled Medications    chlordiazePOXIDE  50 mg Oral 4x Daily    atenolol  25 mg Oral Daily    atorvastatin  40 mg Oral Daily    ferrous sulfate  325 mg Oral Daily    therapeutic multivitamin-minerals  1 tablet Oral Daily    pantoprazole  40 mg Oral Daily    sertraline  25 mg Oral Daily    sodium chloride flush  10 mL Intravenous 2 times per day    sodium chloride flush  10 mL Intravenous 2 times per day    enoxaparin  40 mg Subcutaneous Daily    thiamine  100 mg Oral Daily     PRN Meds: sodium chloride flush, sodium chloride flush, acetaminophen **OR** acetaminophen, polyethylene glycol, promethazine **OR** ondansetron, LORazepam **OR** LORazepam **OR** LORazepam **OR** LORazepam **OR** LORazepam **OR** LORazepam **OR** LORazepam **OR** LORazepam      Intake/Output Summary (Last 24 hours) at 9/7/2020 1218  Last data filed at 9/7/2020 1135  Gross per 24 hour   Intake 1240 ml   Output 0 ml   Net 1240 ml       Physical Exam Performed:    /78   Pulse 73   Temp 98.4 °F (36.9 °C) (Oral)   Resp 16   Ht 5' 5\" (1.651 m)   Wt 180 lb (81.6 kg)   SpO2 100%   BMI 29.95 kg/m²     General appearance: No apparent distress, appears stated age and cooperative. HEENT: Pupils equal, round, and reactive to light. Conjunctivae/corneas clear. Neck: Supple, with full range of motion. No jugular venous distention. Trachea midline. Respiratory:  Normal respiratory effort.  Clear to auscultation, bilaterally without Rales/Wheezes/Rhonchi. Cardiovascular: Regular rate and rhythm with normal S1/S2 without murmurs, rubs or gallops. Abdomen: Soft, non-tender, non-distended with normal bowel sounds. Musculoskeletal: No clubbing, cyanosis or edema bilaterally. Full range of motion without deformity. Skin: Skin color, texture, turgor normal.  No rashes or lesions. Neurologic:  Neurovascularly intact without any focal sensory/motor deficits. Cranial nerves: II-XII intact, grossly non-focal.  Psychiatric: Alert and oriented, thought content appropriate, normal insight  Capillary Refill: Brisk,< 3 seconds   Peripheral Pulses: +2 palpable, equal bilaterally       Labs:   Recent Labs     09/06/20 1517 09/07/20 0920   WBC 5.9 4.4   HGB 8.9* 8.9*   HCT 27.6* 27.4*    236     Recent Labs     09/06/20 1517 09/07/20 0920    139   K 3.8 3.8    103   CO2 18* 23   BUN 12 10   CREATININE 0.7 0.7   CALCIUM 8.3 8.1*     Recent Labs     09/06/20 1517 09/07/20  0920   AST 49* 50*   ALT 37 36   BILITOT 0.4 0.5   ALKPHOS 113 126     No results for input(s): INR in the last 72 hours. Recent Labs     09/06/20 1517   TROPONINI <0.01       Urinalysis:      Lab Results   Component Value Date    NITRU Negative 09/07/2020    WBCUA 0-2 03/21/2020    BACTERIA 2+ 03/21/2020    RBCUA 0-2 03/21/2020    BLOODU Negative 09/07/2020    SPECGRAV 1.015 09/07/2020    GLUCOSEU Negative 09/07/2020       Radiology:  CT HEAD WO CONTRAST   Final Result   No acute intracranial abnormality. XR CHEST PORTABLE   Final Result   No acute process. 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  - CT head negative. CXR negative. UA pending.   - check ECHO.  - PT/OT evaluation. Alcohol abuse - patient states she drinks 1-2 bottles of wine daily.  - [facility] protocol ordered. Interested in alcohol cessation. - scheduled Librium started 9/7. Anemia, macrocytic - likely due to EtOH abuse.  - check folate and B12. HTN - controlled. Continue home atenolol. Depression - continue home Zoloft.     DVT Prophylaxis: Lovenox  Diet: DIET GENERAL;  Code Status: Full Code    PT/OT Eval Status: recommend IPU    Dispo - likely 1-2 days    NAHID Soria - CNP

## 2020-09-07 NOTE — CARE COORDINATION
Pt's neighbor Giuliano Mccray arrived to the hospital to provide information about pt and her living conditions at home. SW offered only generic information about the ability for anyone to Call APS if deemed necessary. She offered to show photos of the home which SW declined to see. She notes that there are 4 cats in the home as well. Alveda Bence notes that she is going to visit with pt on this day and will try to call her sister. CM will assess pt with the above in mind to assist with discharge planning needs.

## 2020-09-08 LAB
ANION GAP SERPL CALCULATED.3IONS-SCNC: 9 MMOL/L (ref 3–16)
BUN BLDV-MCNC: 10 MG/DL (ref 7–20)
CALCIUM SERPL-MCNC: 8.1 MG/DL (ref 8.3–10.6)
CHLORIDE BLD-SCNC: 109 MMOL/L (ref 99–110)
CO2: 24 MMOL/L (ref 21–32)
CREAT SERPL-MCNC: 0.8 MG/DL (ref 0.6–1.2)
FOLATE: 3.26 NG/ML (ref 4.78–24.2)
GFR AFRICAN AMERICAN: >60
GFR NON-AFRICAN AMERICAN: >60
GLUCOSE BLD-MCNC: 100 MG/DL (ref 70–99)
LV EF: 60 %
LVEF MODALITY: NORMAL
MAGNESIUM: 1.5 MG/DL (ref 1.8–2.4)
POTASSIUM SERPL-SCNC: 3.6 MMOL/L (ref 3.5–5.1)
SODIUM BLD-SCNC: 142 MMOL/L (ref 136–145)
VITAMIN B-12: 504 PG/ML (ref 211–911)

## 2020-09-08 PROCEDURE — 2580000003 HC RX 258: Performed by: FAMILY MEDICINE

## 2020-09-08 PROCEDURE — 96372 THER/PROPH/DIAG INJ SC/IM: CPT

## 2020-09-08 PROCEDURE — 96366 THER/PROPH/DIAG IV INF ADDON: CPT

## 2020-09-08 PROCEDURE — 96365 THER/PROPH/DIAG IV INF INIT: CPT

## 2020-09-08 PROCEDURE — 6370000000 HC RX 637 (ALT 250 FOR IP): Performed by: FAMILY MEDICINE

## 2020-09-08 PROCEDURE — 97110 THERAPEUTIC EXERCISES: CPT

## 2020-09-08 PROCEDURE — 93306 TTE W/DOPPLER COMPLETE: CPT

## 2020-09-08 PROCEDURE — G0378 HOSPITAL OBSERVATION PER HR: HCPCS

## 2020-09-08 PROCEDURE — 80048 BASIC METABOLIC PNL TOTAL CA: CPT

## 2020-09-08 PROCEDURE — 83735 ASSAY OF MAGNESIUM: CPT

## 2020-09-08 PROCEDURE — 97530 THERAPEUTIC ACTIVITIES: CPT

## 2020-09-08 PROCEDURE — 36415 COLL VENOUS BLD VENIPUNCTURE: CPT

## 2020-09-08 PROCEDURE — 97535 SELF CARE MNGMENT TRAINING: CPT

## 2020-09-08 PROCEDURE — 97116 GAIT TRAINING THERAPY: CPT

## 2020-09-08 PROCEDURE — 6360000002 HC RX W HCPCS: Performed by: NURSE PRACTITIONER

## 2020-09-08 PROCEDURE — 6360000002 HC RX W HCPCS: Performed by: FAMILY MEDICINE

## 2020-09-08 PROCEDURE — 6370000000 HC RX 637 (ALT 250 FOR IP): Performed by: NURSE PRACTITIONER

## 2020-09-08 RX ORDER — FOLIC ACID 1 MG/1
1 TABLET ORAL DAILY
Status: DISCONTINUED | OUTPATIENT
Start: 2020-09-08 | End: 2020-09-13 | Stop reason: HOSPADM

## 2020-09-08 RX ORDER — MAGNESIUM SULFATE IN WATER 40 MG/ML
4 INJECTION, SOLUTION INTRAVENOUS ONCE
Status: COMPLETED | OUTPATIENT
Start: 2020-09-08 | End: 2020-09-08

## 2020-09-08 RX ORDER — CHLORDIAZEPOXIDE HYDROCHLORIDE 25 MG/1
50 CAPSULE, GELATIN COATED ORAL 3 TIMES DAILY
Status: DISCONTINUED | OUTPATIENT
Start: 2020-09-08 | End: 2020-09-09

## 2020-09-08 RX ADMIN — CHLORDIAZEPOXIDE HYDROCHLORIDE 50 MG: 25 CAPSULE ORAL at 21:08

## 2020-09-08 RX ADMIN — Medication 10 ML: at 21:08

## 2020-09-08 RX ADMIN — Medication 100 MG: at 08:35

## 2020-09-08 RX ADMIN — Medication 10 ML: at 08:35

## 2020-09-08 RX ADMIN — Medication 1 TABLET: at 08:35

## 2020-09-08 RX ADMIN — ATORVASTATIN CALCIUM 40 MG: 40 TABLET, FILM COATED ORAL at 08:35

## 2020-09-08 RX ADMIN — MAGNESIUM SULFATE HEPTAHYDRATE 4 G: 40 INJECTION, SOLUTION INTRAVENOUS at 15:40

## 2020-09-08 RX ADMIN — ENOXAPARIN SODIUM 40 MG: 40 INJECTION SUBCUTANEOUS at 08:36

## 2020-09-08 RX ADMIN — CHLORDIAZEPOXIDE HYDROCHLORIDE 50 MG: 25 CAPSULE ORAL at 15:23

## 2020-09-08 RX ADMIN — FERROUS SULFATE TAB 325 MG (65 MG ELEMENTAL FE) 325 MG: 325 (65 FE) TAB at 08:36

## 2020-09-08 RX ADMIN — CHLORDIAZEPOXIDE HYDROCHLORIDE 50 MG: 25 CAPSULE ORAL at 08:35

## 2020-09-08 RX ADMIN — FOLIC ACID 1 MG: 1 TABLET ORAL at 15:23

## 2020-09-08 RX ADMIN — SERTRALINE HYDROCHLORIDE 25 MG: 50 TABLET, FILM COATED ORAL at 08:36

## 2020-09-08 RX ADMIN — ATENOLOL 25 MG: 25 TABLET ORAL at 08:36

## 2020-09-08 RX ADMIN — PANTOPRAZOLE SODIUM 40 MG: 40 TABLET, DELAYED RELEASE ORAL at 08:36

## 2020-09-08 ASSESSMENT — PAIN SCALES - GENERAL
PAINLEVEL_OUTOF10: 0

## 2020-09-08 NOTE — PROGRESS NOTES
Occupational Therapy  Facility/Department: [facility] B3 - MED SURG  Daily Treatment Note  NAME: Renée Otero  : 1950  MRN: [id number]    Date of Service: 2020    Discharge Recommendations:  IP Rehab       Assessment   Performance deficits / Impairments: Decreased functional mobility ; Decreased endurance;Decreased coordination;Decreased ADL status; Decreased balance;Decreased strength;Decreased safe awareness;Decreased high-level IADLs  Assessment: Pt sitting EOB upon OT arrival. Pt able to manage LB dressing with CGA while standing in order to mono over hips. Pt requires CGA-MIN A with RW in order to manage short house hold distance, which is far below pt's baseline level. Pt demo 1 instance of LOB, pt states due to BLE weakness, fatigue. Pt at this time is not safe to return home, would need increased assistance in order to manage ADLs, and functional activities due to weakness and poor balance. Cont POC  REQUIRES OT FOLLOW UP: Yes  Safety Devices  Type of devices: Left in bed;Bed alarm in place;Call light within reach;Gait belt         Patient Diagnosis(es): The primary encounter diagnosis was Other fatigue. A diagnosis of Acute alcoholic intoxication without complication (HCC) was also pertinent to this visit. has a past medical history of Arthritis, Cancer ([facility] [address] COPD (chronic obstructive pulmonary disease) ([facility] [address] Fatty liver, Fractures, Gastric ulcer, High blood pressure, Hyperlipidemia, and Palpitations. has a past surgical history that includes Hysterectomy (); Tubal ligation (); Skin cancer excision; Tonsillectomy and adenoidectomy; Intracapsular cataract extraction (Left, 2019); Upper gastrointestinal endoscopy (N/A, 2019); and Upper gastrointestinal endoscopy (N/A, 3/23/2020).     Restrictions  Restrictions/Precautions  Restrictions/Precautions: Up as Tolerated, Seizure, Fall Risk  Position Activity Restriction  Other position/activity restrictions: Telemetry, up with assist  Subjective   General  Chart Reviewed: Yes  Patient assessed for rehabilitation services?: Yes  Vital Signs  Patient Currently in Pain: Denies   Orientation  Orientation  Overall Orientation Status: Within Normal Limits  Objective    ADL  Feeding: Independent(Pt sitting EOB eating breakfast upon OT arrival)  UE Dressing: Supervision  LE Dressing: Contact guard assistance(threading BLE into pants, CGA for pulling over hips)        Balance  Sitting Balance: Supervision(sitting EOB upon OT arrival)  Standing Balance: Minimal assistance  Standing Balance  Time: 2-3 minutes  Activity: ambulating in hallway  Functional Mobility  Functional - Mobility Device: Rolling Walker  Activity: Other(ambulating in hallway)  Assist Level: Contact guard assistance  Functional Mobility Comments: Pt demos unsteady gait while walking. Pt demos 1 LOB requires MOD A in order to correct balance, find COG. Pt states feeling BLE weakness  Bed mobility  Comment: pt sitting EOB upon OT arrival  Transfers  Sit to stand: Contact guard assistance  Stand to sit: Contact guard assistance            Cognition  Overall Cognitive Status: Exceptions  Arousal/Alertness: Appropriate responses to stimuli  Following Commands: Follows one step commands with increased time; Follows one step commands with repetition  Attention Span: Attends with cues to redirect  Memory: Appears intact  Safety Judgement: Decreased awareness of need for assistance;Decreased awareness of need for safety  Problem Solving: Assistance required to generate solutions  Insights: Decreased awareness of deficits  Initiation: Requires cues for some  Sequencing: Requires cues for some              Plan   Plan  Times per week: 3-5x per week  Current Treatment Recommendations: Strengthening, Balance Training, Functional Mobility Training, Safety Education & Training, Self-Care / ADL, Equipment Evaluation, Education, & procurement    AM-PAC Score        AM-PAC Inpatient Daily Activity Raw Score: 17 (09/08/20 1241)  AM-PAC Inpatient ADL T-Scale Score : 37.26 (09/08/20 1241)  ADL Inpatient CMS 0-100% Score: 50.11 (09/08/20 1241)  ADL Inpatient CMS G-Code Modifier : CK (09/08/20 1241)    Goals  Short term goals  Time Frame for Short term goals: one week unless otherwise specified 9/14  Short term goal 1: Patient will complete toilet transfer with CGA and use of RW. Short term goal 2: Patient will complete standing grooming tasks with CGA. Short term goal 3: Patient will complete LB dressing with supervision. Short term goal 4: Patient will complete bathing assessment and upgrade.   Patient Goals   Patient goals : \"to get stronger\"       Therapy Time   Individual Concurrent Group Co-treatment   Time In 1005         Time Out 1035         Minutes 30         Timed Code Treatment Minutes: Varun [address] [place] [address]

## 2020-09-08 NOTE — FLOWSHEET NOTE
Met with Pt for Advance Directives consult. Pt wanting to read over on her own. Left copy of AD forms with Pt and informed her of 's availability should she wish to complete at a later time. Prayed with Pt for her and her mother. [address]       09/08/20 1303   Encounter Summary   Services provided to: Patient   Referral/Consult From: Patient   Continue Visiting   (9/8 initial, left ADs, sppt and prayer)   Complexity of Encounter Moderate   Length of Encounter 15 minutes   Spiritual Assessment Completed Yes   Advance Care Planning Yes   Spiritual/Congregation   Type Spiritual support   Assessment Calm; Approachable;Coping   Intervention Active listening;Explored feelings, thoughts, concerns;Prayer   Outcome Comfort;Expressed gratitude;Engaged in conversation;Expressed feelings/needs/concerns   Advance Directives (For Healthcare)   Healthcare Directive No, patient does not have an advance directive for healthcare treatment   Advance Directives Documents given

## 2020-09-08 NOTE — CARE COORDINATION
Care Coordination, Acute Rehab     After review, this patient is felt to be:      []  Appropriate for Acute Inpatient Rehab    [x]  Appropriate for Acute Inpatient Rehab Pending Insurance Authorization    []  Not appropriate for Acute Inpatient Rehab    []  Not appropriate at this time, however evaluation ongoing      Precert initiated with [facility] [place]. Ref#: [id number]. Will notify DCP with further updates.  Thank you for the referral. Electronically signed by Jason Lopez RN on 9/8/2020 at 11:11 AM

## 2020-09-08 NOTE — PROGRESS NOTES
Hospitalist Progress Note      PCP: Jennifer Carlson MD    Date of Admission: 9/6/2020    Chief Complaint: weakness, wobbly    Hospital Course:  77year old female with a PMH of alcohol abuse, HTN, and GERD, who presented to [facility], [place] with complaints of weakness and frequent falls. Subjective: No new complaints. No evidence of DTs. Medications:  Reviewed    Infusion Medications     Scheduled Medications    chlordiazePOXIDE  50 mg Oral TID    atenolol  25 mg Oral Daily    atorvastatin  40 mg Oral Daily    ferrous sulfate  325 mg Oral Daily    therapeutic multivitamin-minerals  1 tablet Oral Daily    pantoprazole  40 mg Oral Daily    sertraline  25 mg Oral Daily    sodium chloride flush  10 mL Intravenous 2 times per day    sodium chloride flush  10 mL Intravenous 2 times per day    enoxaparin  40 mg Subcutaneous Daily    thiamine  100 mg Oral Daily     PRN Meds: sodium chloride flush, sodium chloride flush, acetaminophen **OR** acetaminophen, polyethylene glycol, promethazine **OR** ondansetron, LORazepam **OR** LORazepam **OR** LORazepam **OR** LORazepam **OR** LORazepam **OR** LORazepam **OR** LORazepam **OR** LORazepam      Intake/Output Summary (Last 24 hours) at 9/8/2020 1343  Last data filed at 9/8/2020 1021  Gross per 24 hour   Intake 980 ml   Output 0 ml   Net 980 ml       Physical Exam Performed:    /72   Pulse 72   Temp 98 °F (36.7 °C) (Oral)   Resp 17   Ht 5' 5\" (1.651 m)   Wt 180 lb (81.6 kg)   SpO2 100%   BMI 29.95 kg/m²     General appearance: No apparent distress, appears stated age and cooperative. HEENT: Pupils equal, round, and reactive to light. Conjunctivae/corneas clear. Neck: Supple, with full range of motion. No jugular venous distention. Trachea midline. Respiratory:  Normal respiratory effort. Clear to auscultation, bilaterally without Rales/Wheezes/Rhonchi.   Cardiovascular: Regular rate and rhythm with normal S1/S2 without murmurs, rubs or gallops. Abdomen: Soft, non-tender, non-distended with normal bowel sounds. Musculoskeletal: No clubbing, cyanosis or edema bilaterally. Full range of motion without deformity. Skin: Skin color, texture, turgor normal.  No rashes or lesions. Neurologic:  Neurovascularly intact without any focal sensory/motor deficits. Cranial nerves: II-XII intact, grossly non-focal.  Psychiatric: Alert and oriented, thought content appropriate, normal insight  Capillary Refill: Brisk,< 3 seconds   Peripheral Pulses: +2 palpable, equal bilaterally       Labs:   Recent Labs     09/06/20 1517 09/07/20  0920   WBC 5.9 4.4   HGB 8.9* 8.9*   HCT 27.6* 27.4*    236     Recent Labs     09/06/20  1517 09/07/20  0920 09/08/20  0805    139 142   K 3.8 3.8 3.6    103 109   CO2 18* 23 24   BUN 12 10 10   CREATININE 0.7 0.7 0.8   CALCIUM 8.3 8.1* 8.1*     Recent Labs     09/06/20 1517 09/07/20  0920   AST 49* 50*   ALT 37 36   BILITOT 0.4 0.5   ALKPHOS 113 126     No results for input(s): INR in the last 72 hours. Recent Labs     09/06/20  1517   TROPONINI <0.01       Urinalysis:      Lab Results   Component Value Date    NITRU Negative 09/07/2020    WBCUA 0-2 03/21/2020    BACTERIA 2+ 03/21/2020    RBCUA 0-2 03/21/2020    BLOODU Negative 09/07/2020    SPECGRAV 1.015 09/07/2020    GLUCOSEU Negative 09/07/2020       Radiology:  CT HEAD WO CONTRAST   Final Result   No acute intracranial abnormality. XR CHEST PORTABLE   Final Result   No acute process. 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  - CT head negative. CXR negative. UA pending.   - ECHO pending.  - PT/OT evaluation. Alcohol abuse - patient states she drinks 1-2 bottles of wine daily.  - [facility] protocol ordered. Interested in alcohol cessation.   - scheduled Librium reduced to 50 mg TID on 9/8. Anemia, macrocytic - likely due to EtOH abuse.  - folate  Low at 3.26.  B12 normal at 504. HTN - controlled. Continue home atenolol. Depression - continue home Zoloft.     DVT Prophylaxis: Lovenox  Diet: DIET GENERAL;  Code Status: Full Code    PT/OT Eval Status: recommend IPU    Dispo - likely 1-2 days    Linnie Boxer, APRN - CNP

## 2020-09-08 NOTE — PROGRESS NOTES
Tonsillectomy and adenoidectomy; Intracapsular cataract extraction (Left, 1/11/2019); Upper gastrointestinal endoscopy (N/A, 8/13/2019); and Upper gastrointestinal endoscopy (N/A, 3/23/2020). Restrictions  Restrictions/Precautions  Restrictions/Precautions: Up as Tolerated, Seizure, Fall Risk  Position Activity Restriction  Other position/activity restrictions: Telemetry, up with assist  Subjective   General  Chart Reviewed: Yes  Additional Pertinent Hx: Hx alcohol abuse; CT head (-)  Response To Previous Treatment: Patient with no complaints from previous session. Family / Caregiver Present: No  Referring Practitioner: Romana Raker, MD  Subjective  Subjective: Pt sitting EOB bed on approach, reports fatigue but agreeable to PT evaluation  General Comment  Comments: RN cleared pt for session  Pain Screening  Patient Currently in Pain: Denies  Vital Signs  Patient Currently in Pain: Denies       Orientation  Orientation  Overall Orientation Status: Within Normal Limits       Objective   Bed mobility  Supine to Sit: Unable to assess  Sit to Supine: Unable to assess  Scooting: Supervision(to EOB)  Comment: Pt sitting EOB upon arrival  Transfers  Sit to Stand: Contact guard assistance  Stand to sit: Contact guard assistance  Comment: EOB to RW, commode to RW, , cues for safety, cues for hand placement  Ambulation  Ambulation?: Yes  Ambulation 1  Surface: level tile  Device: Rolling Walker  Assistance: Minimal assistance; Moderate assistance  Quality of Gait: Narrow ALONDRA, B increased hip flexion, intermittent scissoring pattern, increased step length B.  Pt moderately unsteady with 3 significant LOB, mod A to correct  Gait Deviations: Slow Ting  Distance: 15' + 120 ft  Comments: Pt requires cues for safety and cues to maintain RW within safe distance to ALONDRA     Balance  Posture: Fair  Sitting - Static: Good  Sitting - Dynamic: Good  Standing - Static: Fair  Standing - Dynamic: Fair;-  Exercises  Gluteal Sets: x 10 B  Hip Flexion: x 10 B  Hip Abduction: x 10 B clamshells with pillow squeeze  Knee Long Arc Quad: x 15 B  Ankle Pumps: x 20 B  Comments: Pt very distractible and with difficulty following 1-2 step commands , requiring lots of cues and redirection                                                                                  AM-PAC Score  AM-PAC Inpatient Mobility Raw Score : 17 (09/08/20 1729)  AM-PAC Inpatient T-Scale Score : 42.13 (09/08/20 1729)  Mobility Inpatient CMS 0-100% Score: 50.57 (09/08/20 1729)  Mobility Inpatient CMS G-Code Modifier : CK (09/08/20 1729)          Goals  Short term goals  Time Frame for Short term goals: 1 week 9/14/20 (unless otherwise specified0  Short term goal 1: Pt will complete supine to/from sit with I  Short term goal 2: Pt will complete sit to/from stand with LRAD with MI; 9/8 CGA  Short term goal 3: Pt will ambulate >150' with LRAD with MI without LOB; 9/8 120 ft with min to mod A  Short term goal 4: Pt will ascend/descend 12 steps with UHR with MI without LOB  Short term goal 5: 9/11/20: Pt will participate in 12-15 reps BLE exercises to increase strength and increase I with functional mobility; 9/8 10 reps  Patient Goals   Patient goals : \"Go home\"    Plan    Plan  Times per week: 3-5x/wk  Times per day: Daily  Specific instructions for Next Treatment: Progress mobility as tolerated  Current Treatment Recommendations: Strengthening, Neuromuscular Re-education, Home Exercise Program, ROM, Safety Education & Training, Balance Training, Endurance Training, Patient/Caregiver Education & Training, Functional Mobility Training, Transfer Training, Gait Training, Stair training  Safety Devices  Type of devices:  All fall risk precautions in place, Bed alarm in place, Call light within reach, Nurse notified, Gait belt, Patient at risk for falls, Left in bed  Restraints  Initially in place: No     Therapy Time   Individual Concurrent Group Co-treatment   Time In 1424         Time

## 2020-09-08 NOTE — CARE COORDINATION
CASE MANAGEMENT INITIAL ASSESSMENT      Reviewed chart and completed assessment with: pt at bedside  Explained Case Management role/services. Primary contact information: sisterOlya [phone]    Health Care Decision Maker: Not discussed    Admit date/status: 9/6 Observation   Diagnosis: Weakness  Is this a Readmission?: No    Insurance: [employer]  Precert required for SNF - Y        3 night stay required - N    Living arrangements, Adls, care needs, prior to admission: Pt lives at home alone with animals, does not use any DME but states is getting a walker from a friend. Pt drives, has PCP, has no home care. Transportation: Pt to arrange with family if going home. PT/OT recs: IP Rehab    Barriers to discharge: none noted    Plan/comments: Pt does not want to be gone from home for more than 10days, agreed to referral to ARU and have PM&R doctor assess her. Writer spoke with Sumit/BERTHA, she will review referral.  Pt will need precert. Pt agreeable to home care if not ARU, does not agree to SNF. Will continue to follow and coordinate discharge arrangements. ECOC on chart for MD signature.   OLGA Montesinos-RN

## 2020-09-08 NOTE — CONSULTS
Patient: Kaylynn Pollard  [id number]  Date: 9/8/2020      Chief Complaint: weakness    History of Present Illness/Hospital Course:  Ms Hussein Baugh is a 77year old female with h/o EtOH abuse, HTN admitted 9/6/2020 with complaints of frequent falls and weakness. CT head was negative; she was started on CIWA protocol and is slowly weaning her librium. Generally requiring CGA to min A with therapy. has a past medical history of Arthritis, Cancer ([facility] [address] COPD (chronic obstructive pulmonary disease) ([facility] [address] Fatty liver, Fractures, Gastric ulcer, High blood pressure, Hyperlipidemia, and Palpitations. has a past surgical history that includes Hysterectomy (1988); Tubal ligation (1986); Skin cancer excision; Tonsillectomy and adenoidectomy; Intracapsular cataract extraction (Left, 1/11/2019); Upper gastrointestinal endoscopy (N/A, 8/13/2019); and Upper gastrointestinal endoscopy (N/A, 3/23/2020). reports that she has been smoking. She has been smoking about 1.00 pack per day. She has never used smokeless tobacco. She reports current alcohol use. She reports that she does not use drugs. family history includes Cancer in her paternal grandmother; Osteoarthritis in her mother. REVIEW OF SYSTEMS:   CONSTITUTIONAL: negative for fevers, chills, diaphoresis, activity change, appetite change, fatigue, night sweats and unexpected weight change.    EYES: negative for blurred vision, eye discharge, visual disturbance and icterus  HEENT: negative for hearing loss, tinnitus, ear drainage, sinus pressure, nasal congestion, epistaxis and snoring  RESPIRATORY: Negative for hemoptysis, cough, sputum production  CARDIOVASCULAR: negative for chest pain, palpitations, exertional chest pressure/discomfort, edema, syncope  GASTROINTESTINAL: negative for nausea, vomiting, diarrhea, constipation, blood in stool and abdominal pain  GENITOURINARY: negative for frequency, dysuria, urinary incontinence, decreased urine volume, and hematuria  HEMATOLOGIC/LYMPHATIC: negative for easy bruising, bleeding and lymphadenopathy  ALLERGIC/IMMUNOLOGIC: negative for recurrent infections, angioedema, anaphylaxis and drug reactions  ENDOCRINE: negative for weight changes and diabetic symptoms including polyuria, polydipsia and polyphagia  MUSCULOSKELETAL: negative for pain, joint swelling, decreased range of motion and muscle weakness  NEUROLOGICAL: negative for headaches, slurred speech, unilateral weakness  PSYCHIATRIC/BEHAVIORAL: negative for hallucinations, behavioral problems, confusion and agitation. Physical Examination:  Vitals:   Patient Vitals for the past 24 hrs:   BP Temp Temp src Pulse Resp SpO2   09/08/20 1131 104/72 98 °F (36.7 °C) Oral 72 17 100 %   09/08/20 0830 120/78 97.8 °F (36.6 °C) Oral 73 16 96 %   09/08/20 0600 98/60 97.6 °F (36.4 °C) Oral 98 18 96 %   09/08/20 0200 -- 97.8 °F (36.6 °C) Oral -- -- --   09/07/20 2330 (!) 97/55 97.9 °F (36.6 °C) Oral 90 18 97 %   09/07/20 2030 91/60 98.1 °F (36.7 °C) Oral 87 18 98 %   09/07/20 1530 (!) 90/57 98.1 °F (36.7 °C) Oral 77 18 98 %     Mood: Stable  Const: No distress  ENT: Oral mucosa moist  Eyes: No discharge or injection  CV: Regular rate and rhythm, no murmur rub or gallop noted  Resp: Lungs clear to auscultation bilaterally, no rales wheezes or ronchi  GI: Soft, nontender, nondistended. Normal bowel sounds. Neuro: Alert, oriented, appropriate. No cranial nerve deficits appreciated. Skin: No lesions or rashes noted. MSK: No joint abnormalities noted.        Lab Results   Component Value Date    WBC 4.4 09/07/2020    HGB 8.9 (L) 09/07/2020    HCT 27.4 (L) 09/07/2020    .9 (H) 09/07/2020     09/07/2020     Lab Results   Component Value Date    INR 0.88 03/21/2020    INR 0.92 08/11/2019    PROTIME 10.2 03/21/2020    PROTIME 10.5 08/11/2019     Lab Results   Component Value Date    CREATININE 0.8 09/08/2020    BUN 10 09/08/2020     09/08/2020    K 3.6 09/08/2020  09/08/2020    CO2 24 09/08/2020     Lab Results   Component Value Date    ALT 36 09/07/2020    AST 50 (H) 09/07/2020    ALKPHOS 126 09/07/2020    BILITOT 0.5 09/07/2020     CT HEAD WO CONTRAST   Final Result   No acute intracranial abnormality.           XR CHEST PORTABLE   Final Result   No acute process. Assessment:  1. Weakness   2. Gait Disturbance   3. H/o EtOH abuse      Recommendations:  Patient with new functional deficits and ongoing medical complexity. Demonstrates ability to tolerate 3 hours therapy/day. She is a good candidate for acute inpatient rehab when medically appropriate. Thank you for this consult. Please contact me with any questions or concerns. Seven Hill MD 9/8/2020, 2:36 PM

## 2020-09-08 NOTE — CARE COORDINATION
Writer spoke with Apurva Poon, Dr Luther Coronado has accepted and she will start precert now, pt's [employer] Medicare is not the most likely to approve IPRehab level of care. Writer will discus with Freda Cain NP at [facility].   OLGA Hargrove-RN

## 2020-09-09 ENCOUNTER — APPOINTMENT (OUTPATIENT)
Dept: MRI IMAGING | Age: 70
End: 2020-09-09
Payer: MEDICARE

## 2020-09-09 LAB
ANION GAP SERPL CALCULATED.3IONS-SCNC: 11 MMOL/L (ref 3–16)
BUN BLDV-MCNC: 12 MG/DL (ref 7–20)
CALCIUM SERPL-MCNC: 8.3 MG/DL (ref 8.3–10.6)
CHLORIDE BLD-SCNC: 107 MMOL/L (ref 99–110)
CO2: 23 MMOL/L (ref 21–32)
CREAT SERPL-MCNC: 0.7 MG/DL (ref 0.6–1.2)
GFR AFRICAN AMERICAN: >60
GFR NON-AFRICAN AMERICAN: >60
GLUCOSE BLD-MCNC: 101 MG/DL (ref 70–99)
MAGNESIUM: 2.1 MG/DL (ref 1.8–2.4)
POTASSIUM SERPL-SCNC: 3.9 MMOL/L (ref 3.5–5.1)
SODIUM BLD-SCNC: 141 MMOL/L (ref 136–145)

## 2020-09-09 PROCEDURE — 97530 THERAPEUTIC ACTIVITIES: CPT

## 2020-09-09 PROCEDURE — 97116 GAIT TRAINING THERAPY: CPT

## 2020-09-09 PROCEDURE — U0003 INFECTIOUS AGENT DETECTION BY NUCLEIC ACID (DNA OR RNA); SEVERE ACUTE RESPIRATORY SYNDROME CORONAVIRUS 2 (SARS-COV-2) (CORONAVIRUS DISEASE [COVID-19]), AMPLIFIED PROBE TECHNIQUE, MAKING USE OF HIGH THROUGHPUT TECHNOLOGIES AS DESCRIBED BY CMS-2020-01-R: HCPCS

## 2020-09-09 PROCEDURE — G0378 HOSPITAL OBSERVATION PER HR: HCPCS

## 2020-09-09 PROCEDURE — 2580000003 HC RX 258: Performed by: FAMILY MEDICINE

## 2020-09-09 PROCEDURE — 36415 COLL VENOUS BLD VENIPUNCTURE: CPT

## 2020-09-09 PROCEDURE — 70551 MRI BRAIN STEM W/O DYE: CPT

## 2020-09-09 PROCEDURE — 96372 THER/PROPH/DIAG INJ SC/IM: CPT

## 2020-09-09 PROCEDURE — 6370000000 HC RX 637 (ALT 250 FOR IP): Performed by: NURSE PRACTITIONER

## 2020-09-09 PROCEDURE — 80048 BASIC METABOLIC PNL TOTAL CA: CPT

## 2020-09-09 PROCEDURE — 6360000002 HC RX W HCPCS: Performed by: FAMILY MEDICINE

## 2020-09-09 PROCEDURE — 83735 ASSAY OF MAGNESIUM: CPT

## 2020-09-09 PROCEDURE — 6370000000 HC RX 637 (ALT 250 FOR IP): Performed by: FAMILY MEDICINE

## 2020-09-09 PROCEDURE — 97110 THERAPEUTIC EXERCISES: CPT

## 2020-09-09 RX ORDER — CHLORDIAZEPOXIDE HYDROCHLORIDE 25 MG/1
50 CAPSULE, GELATIN COATED ORAL 2 TIMES DAILY
Status: DISCONTINUED | OUTPATIENT
Start: 2020-09-09 | End: 2020-09-10

## 2020-09-09 RX ADMIN — Medication 100 MG: at 08:43

## 2020-09-09 RX ADMIN — Medication 10 ML: at 08:54

## 2020-09-09 RX ADMIN — ENOXAPARIN SODIUM 40 MG: 40 INJECTION SUBCUTANEOUS at 08:42

## 2020-09-09 RX ADMIN — FOLIC ACID 1 MG: 1 TABLET ORAL at 08:44

## 2020-09-09 RX ADMIN — FERROUS SULFATE TAB 325 MG (65 MG ELEMENTAL FE) 325 MG: 325 (65 FE) TAB at 08:44

## 2020-09-09 RX ADMIN — CHLORDIAZEPOXIDE HYDROCHLORIDE 50 MG: 25 CAPSULE ORAL at 08:44

## 2020-09-09 RX ADMIN — ATORVASTATIN CALCIUM 40 MG: 40 TABLET, FILM COATED ORAL at 08:44

## 2020-09-09 RX ADMIN — PANTOPRAZOLE SODIUM 40 MG: 40 TABLET, DELAYED RELEASE ORAL at 08:44

## 2020-09-09 RX ADMIN — Medication 10 ML: at 20:37

## 2020-09-09 RX ADMIN — Medication 10 ML: at 08:43

## 2020-09-09 RX ADMIN — CHLORDIAZEPOXIDE HYDROCHLORIDE 50 MG: 25 CAPSULE ORAL at 20:37

## 2020-09-09 RX ADMIN — Medication 1 TABLET: at 08:43

## 2020-09-09 RX ADMIN — SERTRALINE HYDROCHLORIDE 25 MG: 50 TABLET, FILM COATED ORAL at 08:44

## 2020-09-09 ASSESSMENT — PAIN SCALES - GENERAL
PAINLEVEL_OUTOF10: 0

## 2020-09-09 NOTE — CARE COORDINATION
Insurance denied for ARU admission. Oscar , [address] notified.  Electronically signed by Elina King RN on 9/9/2020 at 10:07 AM

## 2020-09-09 NOTE — PROGRESS NOTES
Assessment complete. VSS. No new complaints at this time. Bed in the lowest position, call light within reach. Will continue to monitor.

## 2020-09-09 NOTE — PROGRESS NOTES
Physical Therapy  Facility/Department: [facility] - MED SURG  Daily Treatment Note  NAME: Mayte Mccabe  : 1950  MRN: [id number]    Date of Service: 2020    Discharge Recommendations:  IP Rehab        Assessment   Body structures, Functions, Activity limitations: Decreased functional mobility ; Decreased balance;Decreased strength;Decreased safe awareness;Decreased endurance  Assessment: Pt with decreased balance today as compared to yesterday, Baldo for sit<>stand mod A for gait with RW and mod A for sitting and standing balance. Pt demonstrates posterior and lateral lean both in sitting and standing. Pt is aware of deficits to a degree, reports feeling afraid of what's going on with her, asked \"could this be a result of alcoholism? \"  Pt would benefit from cont skilled acute PT to address deficits. Treatment Diagnosis: Impaired balance and gait  Specific instructions for Next Treatment: Progress mobility as tolerated  Prognosis: Fair  Decision Making: Medium Complexity  PT Education: Goals; General Safety;Gait Training;PT Role;Disease Specific Education;Plan of Care; Functional Mobility Training; Injury Prevention;Transfer Training;Precautions  Patient Education: Safe use of AD, importance of OOB mobility. Pt verbalized understanding, will requiring reinforcement secondary to cognition  Barriers to Learning: Cognition, poor safety awareness  REQUIRES PT FOLLOW UP: Yes  Activity Tolerance  Activity Tolerance: Patient limited by fatigue;Patient limited by endurance; Patient limited by cognitive status     Patient Diagnosis(es): The primary encounter diagnosis was Other fatigue. A diagnosis of Acute alcoholic intoxication without complication (HCC) was also pertinent to this visit. has a past medical history of Arthritis, Cancer ([facility] [address] COPD (chronic obstructive pulmonary disease) ([facility] [address] Fatty liver, Fractures, Gastric ulcer, High blood pressure, Hyperlipidemia, and Palpitations.    has a past surgical history that includes Hysterectomy (1988); Tubal ligation (1986); Skin cancer excision; Tonsillectomy and adenoidectomy; Intracapsular cataract extraction (Left, 1/11/2019); Upper gastrointestinal endoscopy (N/A, 8/13/2019); and Upper gastrointestinal endoscopy (N/A, 3/23/2020). Restrictions  Restrictions/Precautions  Restrictions/Precautions: Up as Tolerated, Seizure, Fall Risk  Position Activity Restriction  Other position/activity restrictions: Telemetry, up with assist  Subjective   General  Chart Reviewed: Yes  Additional Pertinent Hx: Hx alcohol abuse; CT head (-)  Response To Previous Treatment: Patient with no complaints from previous session. Family / Caregiver Present: No  Referring Practitioner: Aldean Goldmann, MD  Subjective  Subjective: Pt in bathroom with PCA upon approach, attempting to get up off of toilet with PCA, very unsteady with multiple LOB during and directly after transfer  General Comment  Comments: RN cleared pt for session  Pain Screening  Patient Currently in Pain: Denies  Vital Signs  Patient Currently in Pain: Denies       Orientation  Orientation  Overall Orientation Status: Within Normal Limits       Objective   Bed mobility  Supine to Sit: Unable to assess  Sit to Supine: Unable to assess  Scooting: Stand by assistance(to scoot back on to bed and back into chair)  Transfers  Sit to Stand: Minimal Assistance  Stand to sit: Minimal Assistance  Bed to Chair: Moderate assistance(with RW)  Comment: Pt abandoning walker before safely in front of place of transfer ( bed and chair ), educated pt re safe transfers, making sure back of legs touch chair, bed, etc.  Ambulation  Ambulation?: Yes  Ambulation 1  Surface: level tile  Device: Rolling Walker  Assistance: Moderate assistance  Quality of Gait: Narrow ALONDRA, B increased hip flexion, intermittent scissoring pattern, increased step length B. Pt demo's decreased dynamic balance even compared to yesterday's session.  Pt with several LOB, mod A required for gait to prevent falls, pt with decreased knee extension with ambulation progression, luis on right, pt also demontrating left lateral lean in standing and with gait, tipping R side of walker on 2 occasions, very unsafe this date with gait and with standing  Distance: 10 ft plus 70 ft  Comments: Pt requires cues for safety and cues to maintain RW within safe distance to ALONDRA     Balance  Posture: Poor  Sitting - Static: Fair  Sitting - Dynamic: Fair;-  Standing - Static: Fair;-  Standing - Dynamic: Fair;-  Comments: Pt sat EOB for upper and lower body ther ex, with left lateral and posterior lean, mod A required to sit upright without lean; Pt stood with RW with left lateral and mild posterior lean with mod A  Exercises  Gluteal Sets: x 10 B  Hip Flexion: x 10 B  Hip Abduction: x 10 B clamshells with pillow squeeze  Knee Long Arc Quad: x 15 B  Ankle Pumps: x 20 B  Core Strengthening: x 10 reps of slouch to upright sitting with verbal and tactile cues, poor performance of exercise due to posterolateral lean: x 10 reps of seated alternating arm each forward and overhead with mod A to remain seted upright                                                                                  AM-PAC Score  AM-PAC Inpatient Mobility Raw Score : 15 (09/09/20 1017)  AM-PAC Inpatient T-Scale Score : 39.45 (09/09/20 1017)  Mobility Inpatient CMS 0-100% Score: 57.7 (09/09/20 1017)  Mobility Inpatient CMS G-Code Modifier : CK (09/09/20 1017)          Goals  Short term goals  Time Frame for Short term goals: 1 week 9/14/20 (unless otherwise specified0  Short term goal 1: Pt will complete supine to/from sit with I  Short term goal 2: Pt will complete sit to/from stand with LRAD with MI; 9/9 min A  Short term goal 3: Pt will ambulate >150' with LRAD with MI without LOB; 9/9 70 ft with  mod A  Short term goal 4: Pt will ascend/descend 12 steps with UHR with MI without LOB; 9/9 not safe to attempt  Short term goal 5: 9/11/20: Pt

## 2020-09-09 NOTE — CARE COORDINATION
Writer met with pt again, she agreed to referrals to [facility], [facility], with first choice [facility]. This conversation took a lot of coaxing by Jaleel Virgen, pt understands we need to start precert today for discharge tomorrow.   OLGA Plummer-ALIZA

## 2020-09-09 NOTE — CARE COORDINATION
ARU received denial this morning from insurance. Writer will discuss plan with pt. ROSALIA Byers Ace     1014 Addendum:  Writer presented pt with [facility] list, pt agreed to consider stating \"I have a lot to think about\". Writer updated Kristina Reece, CAROLINE, she will discuss dc planning with pt as well. ROSALIA Byers Ace     [id number] Addendum:  Writer spoke with pt again about SNF choice, pt states she is overwhelmed and this is too much to think about right now. Writer apologized and explained we need insurance approval for SNF and this will take at least a day. Pt verbalized understanding and asked writer to come back this afternoon. ROSALIA Byers Ace     7089 Addendum:  Pt asked to talk to Brenda Almendarez sat and answered questions, pt agrees to go to SNF but cannot tell which one yet, said she would have an answer this afternoon. Writer ordered romero Virgen RN to obtain now.   RSOALIA Byers Ace

## 2020-09-09 NOTE — CARE COORDINATION
[facility] has accepted and will precert now. Also, they can accept a rapid covid because they will quarantine pt for 7days upon admission. Surveillance covid already collected and pending.   ROSALIA Scott

## 2020-09-09 NOTE — PROGRESS NOTES
Hospitalist Progress Note      PCP: Jennifer Carlson MD    Date of Admission: 9/6/2020    Chief Complaint: weakness, wobbly    Hospital Course:  77year old female with a PMH of alcohol abuse, HTN, and GERD, who presented to [address] [place] [address] with complaints of weakness and frequent falls. Subjective: Is unsure if she wants to go to a SNF. Her insurance denied ARU. No DTs. Medications:  Reviewed    Infusion Medications     Scheduled Medications    chlordiazePOXIDE  50 mg Oral BID    folic acid  1 mg Oral Daily    atorvastatin  40 mg Oral Daily    ferrous sulfate  325 mg Oral Daily    therapeutic multivitamin-minerals  1 tablet Oral Daily    pantoprazole  40 mg Oral Daily    sertraline  25 mg Oral Daily    sodium chloride flush  10 mL Intravenous 2 times per day    sodium chloride flush  10 mL Intravenous 2 times per day    enoxaparin  40 mg Subcutaneous Daily    thiamine  100 mg Oral Daily     PRN Meds: sodium chloride flush, sodium chloride flush, acetaminophen **OR** acetaminophen, polyethylene glycol, promethazine **OR** ondansetron, LORazepam **OR** LORazepam **OR** LORazepam **OR** LORazepam **OR** LORazepam **OR** LORazepam **OR** LORazepam **OR** LORazepam      Intake/Output Summary (Last 24 hours) at 9/9/2020 1021  Last data filed at 9/9/2020 0501  Gross per 24 hour   Intake 0 ml   Output 0 ml   Net 0 ml       Physical Exam Performed:    /67   Pulse 72   Temp 97.5 °F (36.4 °C) (Oral)   Resp 16   Ht 5' 5\" (1.651 m)   Wt 180 lb (81.6 kg)   SpO2 94%   BMI 29.95 kg/m²     General appearance: No apparent distress, appears stated age and cooperative. HEENT: Pupils equal, round, and reactive to light. Conjunctivae/corneas clear. Neck: Supple, with full range of motion. No jugular venous distention. Trachea midline. Respiratory:  Normal respiratory effort. Clear to auscultation, bilaterally without Rales/Wheezes/Rhonchi.   Cardiovascular: Regular rate and rhythm with normal S1/S2 drinks 1-2 bottles of wine daily.  - CIWA protocol ordered. Interested in alcohol cessation. - scheduled Librium reduced to 50 mg BID on 9/9. Anemia, macrocytic - likely due to EtOH abuse.  - folate low at 5.02 - started on folic acid supplement. B12 normal at 504. HTN - BP soft. Hold home atenolol. Depression - continue home Zoloft. DVT Prophylaxis: Lovenox  Diet: DIET GENERAL;  Code Status: Full Code    PT/OT Eval Status: recommend IPU, but insurance denied.   Considering SNF    Dispo - pending decision on dispo and MRI of brain    Luc NAHID Almeida - CNP

## 2020-09-10 PROCEDURE — 6370000000 HC RX 637 (ALT 250 FOR IP): Performed by: FAMILY MEDICINE

## 2020-09-10 PROCEDURE — 6370000000 HC RX 637 (ALT 250 FOR IP): Performed by: NURSE PRACTITIONER

## 2020-09-10 PROCEDURE — 97110 THERAPEUTIC EXERCISES: CPT

## 2020-09-10 PROCEDURE — 96372 THER/PROPH/DIAG INJ SC/IM: CPT

## 2020-09-10 PROCEDURE — 6360000002 HC RX W HCPCS: Performed by: FAMILY MEDICINE

## 2020-09-10 PROCEDURE — 51798 US URINE CAPACITY MEASURE: CPT

## 2020-09-10 PROCEDURE — 51701 INSERT BLADDER CATHETER: CPT

## 2020-09-10 PROCEDURE — G0378 HOSPITAL OBSERVATION PER HR: HCPCS

## 2020-09-10 PROCEDURE — 2580000003 HC RX 258: Performed by: FAMILY MEDICINE

## 2020-09-10 PROCEDURE — 97530 THERAPEUTIC ACTIVITIES: CPT

## 2020-09-10 RX ORDER — GABAPENTIN 100 MG/1
100 CAPSULE ORAL NIGHTLY
Status: DISCONTINUED | OUTPATIENT
Start: 2020-09-10 | End: 2020-09-13 | Stop reason: HOSPADM

## 2020-09-10 RX ORDER — CHLORDIAZEPOXIDE HYDROCHLORIDE 25 MG/1
50 CAPSULE, GELATIN COATED ORAL NIGHTLY
Status: DISCONTINUED | OUTPATIENT
Start: 2020-09-11 | End: 2020-09-11

## 2020-09-10 RX ORDER — GABAPENTIN 100 MG/1
100 CAPSULE ORAL 3 TIMES DAILY
Status: DISCONTINUED | OUTPATIENT
Start: 2020-09-10 | End: 2020-09-10

## 2020-09-10 RX ORDER — ASPIRIN 81 MG/1
81 TABLET ORAL DAILY
Status: DISCONTINUED | OUTPATIENT
Start: 2020-09-10 | End: 2020-09-13 | Stop reason: HOSPADM

## 2020-09-10 RX ADMIN — Medication 10 ML: at 08:49

## 2020-09-10 RX ADMIN — FERROUS SULFATE TAB 325 MG (65 MG ELEMENTAL FE) 325 MG: 325 (65 FE) TAB at 08:21

## 2020-09-10 RX ADMIN — ATORVASTATIN CALCIUM 40 MG: 40 TABLET, FILM COATED ORAL at 08:21

## 2020-09-10 RX ADMIN — Medication 10 ML: at 22:43

## 2020-09-10 RX ADMIN — Medication 1 TABLET: at 08:21

## 2020-09-10 RX ADMIN — SERTRALINE HYDROCHLORIDE 25 MG: 50 TABLET, FILM COATED ORAL at 08:21

## 2020-09-10 RX ADMIN — CHLORDIAZEPOXIDE HYDROCHLORIDE 50 MG: 25 CAPSULE ORAL at 08:21

## 2020-09-10 RX ADMIN — FOLIC ACID 1 MG: 1 TABLET ORAL at 08:21

## 2020-09-10 RX ADMIN — ENOXAPARIN SODIUM 40 MG: 40 INJECTION SUBCUTANEOUS at 08:21

## 2020-09-10 RX ADMIN — ASPIRIN 81 MG: 81 TABLET ORAL at 10:05

## 2020-09-10 RX ADMIN — PANTOPRAZOLE SODIUM 40 MG: 40 TABLET, DELAYED RELEASE ORAL at 08:21

## 2020-09-10 RX ADMIN — Medication 100 MG: at 08:49

## 2020-09-10 RX ADMIN — GABAPENTIN 100 MG: 100 CAPSULE ORAL at 22:43

## 2020-09-10 ASSESSMENT — PAIN SCALES - GENERAL
PAINLEVEL_OUTOF10: 0
PAINLEVEL_OUTOF10: 0

## 2020-09-10 NOTE — PROGRESS NOTES
Physical Therapy  Facility/Department: [facility] - MED SURG  Daily Treatment Note  NAME: Isis Dumont  : 1950  MRN: [id number]    Date of Service: 9/10/2020    Discharge Recommendations:  Subacute/Skilled Nursing Facility   PT Equipment Recommendations  Equipment Needed: No    Assessment   Body structures, Functions, Activity limitations: Decreased functional mobility ; Decreased balance;Decreased strength;Decreased safe awareness;Decreased endurance  Assessment: pt agreeable to therapy on second attempt. per RN, pt with recent decline in mobility, CTI (-) for acute changes. pt requires maxA for bed mobility, transfers deferred due to safety concerns as pt dmeo's posterior/L lateral lean. tolreated ther ex with cues and no complaints. conitnue to progress mobility as tolerated. recommend SNF upon d/c to increase ind wiht functional mobility. Treatment Diagnosis: Impaired balance and gait  Specific instructions for Next Treatment: Progress mobility as tolerated  Prognosis: Fair  Decision Making: Medium Complexity  PT Education: Goals; General Safety;Gait Training;PT Role;Disease Specific Education;Plan of Care; Functional Mobility Training; Injury Prevention;Transfer Training;Precautions  Patient Education: Safe use of AD, importance of OOB mobility. Pt verbalized understanding, will requiring reinforcement secondary to cognition  Barriers to Learning: Cognition, poor safety awareness  REQUIRES PT FOLLOW UP: Yes  Activity Tolerance  Activity Tolerance: Patient limited by fatigue;Patient limited by endurance     Patient Diagnosis(es): The primary encounter diagnosis was Other fatigue. A diagnosis of Acute alcoholic intoxication without complication (HCC) was also pertinent to this visit. has a past medical history of Arthritis, Cancer ([facility] [address] COPD (chronic obstructive pulmonary disease) ([facility] [address] Fatty liver, Fractures, Gastric ulcer, High blood pressure, Hyperlipidemia, and Palpitations.    has a past surgical history that includes Hysterectomy (1988); Tubal ligation (1986); Skin cancer excision; Tonsillectomy and adenoidectomy; Intracapsular cataract extraction (Left, 1/11/2019); Upper gastrointestinal endoscopy (N/A, 8/13/2019); and Upper gastrointestinal endoscopy (N/A, 3/23/2020). Restrictions  Restrictions/Precautions  Restrictions/Precautions: Up as Tolerated, Seizure, Fall Risk  Position Activity Restriction  Other position/activity restrictions: Telemetry, up with assist  Subjective   General  Chart Reviewed: Yes  Additional Pertinent Hx: Hx alcohol abuse; CT head (-)  Response To Previous Treatment: Patient with no complaints from previous session. Family / Caregiver Present: No  Referring Practitioner: Janeth Fernandez MD  Subjective  Subjective: pt denies pain  General Comment  Comments: RN cleared pt for session. pt initially declined therapy on first attempt, agreeable on second attempt. Pain Screening  Patient Currently in Pain: No  Vital Signs  Patient Currently in Pain: No       Orientation  Orientation  Overall Orientation Status: Within Functional Limits  Cognition      Objective   Bed mobility  Supine to Sit: Maximum assistance  Sit to Supine: Maximum assistance  Comment: completed with use of bed controls and increased time. Transfers  Sit to Stand: Unable to assess(deferred due to safety concerns)  Stand to sit: Unable to assess        Balance  Posture: Poor  Sitting - Static: Poor  Comments: pt sat EOB x 3-4 min with UE support and max A to maintain midline position due to significant L lateral and posterior lean. Exercises  Heelslides: x15 BLE altenrating  Hip Abduction: x15 BLE  Knee Short Arc Quad: x15 BLE  Ankle Pumps: x15 BLE  Comments: completed supine as pt unable to complete seated EOB due to decreased trunk control.  pt attentive to task however requires cues for proper form         AM-PAC Score  AM-PAC Inpatient Mobility Raw Score : 9 (09/10/20 2461)  AM-PAC Inpatient T-Scale Score : 30.55 (09/10/20 1138)  Mobility Inpatient CMS 0-100% Score: 81.38 (09/10/20 1138)  Mobility Inpatient CMS G-Code Modifier : CM (09/10/20 1138)          Goals  Short term goals  Time Frame for Short term goals: 1 week 9/14/20 (unless otherwise specified0  Short term goal 1: Pt will complete supine to/from sit with I  Short term goal 2: Pt will complete sit to/from stand with LRAD with MI; 9/9 min A  Short term goal 3: Pt will ambulate >150' with LRAD with MI without LOB; 9/9 70 ft with  mod A  Short term goal 4: Pt will ascend/descend 12 steps with UHR with MI without LOB; 9/9 not safe to attempt  Short term goal 5: 9/11/20: Pt will participate in 12-15 reps BLE exercises to increase strength and increase I with functional mobility; 9/9 10 reps  Patient Goals   Patient goals : \"Go home\"    Plan    Plan  Times per week: 3-5x/wk  Times per day: Daily  Specific instructions for Next Treatment: Progress mobility as tolerated  Current Treatment Recommendations: Strengthening, Neuromuscular Re-education, Home Exercise Program, ROM, Safety Education & Training, Balance Training, Endurance Training, Patient/Caregiver Education & Training, Functional Mobility Training, Transfer Training, Gait Training, Stair training  Safety Devices  Type of devices:  All fall risk precautions in place, Call light within reach, Nurse notified, Gait belt, Patient at risk for falls, Telesitter in use, Bed alarm in place, Left in bed  Restraints  Initially in place: No     Therapy Time   Individual Concurrent Group Co-treatment   Time In 0935         Time Out 0958         Minutes 23         Timed Code Treatment Minutes: [address] Barrios, PT

## 2020-09-10 NOTE — CARE COORDINATION
RN CM met with the Pt at the bedside this am to discuss concerns from neighbor, Brooke Steinberg regarding Pt's inability to care for self and deplorable living conditions, along with plans for someone to care for the Pt's 4 cats while she is in rehab for SNF placement. Pt is dirty and hair is matted upon observation in the room. Pt states that her friend,Ruslan Monsalve is going to care for her cats. This RN CM asked for Frank phone number to confirm this plan. Pt refused, stating that she will contact her first. RN CM will follow up with Pt this afternoon. Pt aware that if plan for cats cannot be confirmed, then the [facility] will remove them from the home while the Pt is in rehab. Pt aware of house conditions and aware that report will be made to the health department for her health and safety. Report made to health department by this RN .[phone]. Per Divine Wood at [facility] is still pending. Lab contacted also, and confirmed that covid test is still pending. Will follow.

## 2020-09-10 NOTE — PROGRESS NOTES
Hospitalist Progress Note      PCP: Juan F Correa MD    Date of Admission: 9/6/2020    Chief Complaint: weakness, wobbly    Hospital Course:  77year old female with a PMH of alcohol abuse, HTN, and GERD, who presented to [facility] with complaints of weakness and frequent falls. Subjective: Says she feels extremely weak today. MRI of brain negative. Medications:  Reviewed    Infusion Medications     Scheduled Medications    [START ON 9/11/2020] chlordiazePOXIDE  50 mg Oral Nightly    aspirin  81 mg Oral Daily    gabapentin  100 mg Oral Nightly    folic acid  1 mg Oral Daily    atorvastatin  40 mg Oral Daily    ferrous sulfate  325 mg Oral Daily    therapeutic multivitamin-minerals  1 tablet Oral Daily    pantoprazole  40 mg Oral Daily    sertraline  25 mg Oral Daily    sodium chloride flush  10 mL Intravenous 2 times per day    sodium chloride flush  10 mL Intravenous 2 times per day    enoxaparin  40 mg Subcutaneous Daily    thiamine  100 mg Oral Daily     PRN Meds: sodium chloride flush, sodium chloride flush, acetaminophen **OR** acetaminophen, polyethylene glycol, promethazine **OR** ondansetron      Intake/Output Summary (Last 24 hours) at 9/10/2020 1211  Last data filed at 9/10/2020 0549  Gross per 24 hour   Intake 360 ml   Output 603 ml   Net -243 ml       Physical Exam Performed:    /70   Pulse 91   Temp 97.6 °F (36.4 °C) (Oral)   Resp 16   Ht 5' 5\" (1.651 m)   Wt 180 lb (81.6 kg)   SpO2 99%   BMI 29.95 kg/m²     General appearance: No apparent distress, appears stated age and cooperative. HEENT: Pupils equal, round, and reactive to light. Conjunctivae/corneas clear. Neck: Supple, with full range of motion. No jugular venous distention. Trachea midline. Respiratory:  Normal respiratory effort. Clear to auscultation, bilaterally without Rales/Wheezes/Rhonchi. Cardiovascular: Regular rate and rhythm with normal S1/S2 without murmurs, rubs or gallops.   Abdomen: Soft, non-tender, non-distended with normal bowel sounds. Musculoskeletal: No clubbing, cyanosis or edema bilaterally. Full range of motion without deformity. Skin: Skin color, texture, turgor normal.  No rashes or lesions. Neurologic:  Neurovascularly intact without any focal sensory/motor deficits. Cranial nerves: II-XII intact, grossly non-focal.  Psychiatric: Alert and oriented, thought content appropriate, normal insight  Capillary Refill: Brisk,< 3 seconds   Peripheral Pulses: +2 palpable, equal bilaterally       Labs:   No results for input(s): WBC, HGB, HCT, PLT in the last 72 hours. Recent Labs     09/08/20  0805 09/09/20  0700    141   K 3.6 3.9    107   CO2 24 23   BUN 10 12   CREATININE 0.8 0.7   CALCIUM 8.1* 8.3     No results for input(s): AST, ALT, BILIDIR, BILITOT, ALKPHOS in the last 72 hours. No results for input(s): INR in the last 72 hours. No results for input(s): Charaleja Akinsker in the last 72 hours. Urinalysis:      Lab Results   Component Value Date    NITRU Negative 09/07/2020    WBCUA 0-2 03/21/2020    BACTERIA 2+ 03/21/2020    RBCUA 0-2 03/21/2020    BLOODU Negative 09/07/2020    SPECGRAV 1.015 09/07/2020    GLUCOSEU Negative 09/07/2020       Radiology:  MRI BRAIN WO CONTRAST   Final Result   1. No acute intracranial abnormality. No acute infarction. 2. Mild senescent changes with mild parenchymal volume loss and minimal   chronic microvascular ischemic changes. CT HEAD WO CONTRAST   Final Result   No acute intracranial abnormality. XR CHEST PORTABLE   Final Result   No acute process. 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  - CT head negative. CXR negative. UA pending.   - ECHO completed - EF 60% with grade I DD.    - PT/OT evaluation.  - MRI of brain negative.     Alcohol abuse - patient states she drinks 1-2 bottles of wine daily.  - CIWA protocol ordered. Interested in alcohol cessation. - scheduled Librium reduced to 50 mg nightly on 9/10. Anemia, macrocytic - likely due to EtOH abuse.  - folate low at 8.52 - started on folic acid supplement. B12 normal at 504. HTN - BP soft. Hold home atenolol. Depression - continue home Zoloft. DVT Prophylaxis: Lovenox  Diet: DIET GENERAL;  Code Status: Full Code    PT/OT Eval Status: recommend IPU, but insurance denied.       Dispo - medically stable for DC - pending COVID 19 testing    Alka Marcelo, APRN - CNP

## 2020-09-10 NOTE — PROGRESS NOTES
Upon assessment this morning, patient appeared extremely lethargic. Patient unable to state correct month, aware that she is at [facility]. When administrating medications, patient unable to bring med cup to her mouth. Will continue to monitor patient for any new changes.

## 2020-09-10 NOTE — PROGRESS NOTES
Upon assessment pt is lethargic and weak. Unable to hold cup to take meds weak . Unable to sit herself up in bed or hold herself up in bed. Required use of steady and assistance. Pt alert to self and situation.

## 2020-09-10 NOTE — PLAN OF CARE
Problem: Falls - Risk of:  Goal: Will remain free from falls  Description: Will remain free from falls  9/10/2020 1026 by Cam Hand RN  Outcome: Ongoing  Note: Pt will remain free from falls throughout hospital stay. Fall precautions in place, bed alarm on, bed in lowest position with wheels locked and side rails 2/4 up. Room door open and hourly rounding completed. Will continue to monitor throughout shift.

## 2020-09-11 LAB
ANION GAP SERPL CALCULATED.3IONS-SCNC: 10 MMOL/L (ref 3–16)
BASOPHILS ABSOLUTE: 0 K/UL (ref 0–0.2)
BASOPHILS RELATIVE PERCENT: 0.5 %
BILIRUBIN URINE: ABNORMAL
BLOOD, URINE: NEGATIVE
BUN BLDV-MCNC: 14 MG/DL (ref 7–20)
CALCIUM SERPL-MCNC: 8.1 MG/DL (ref 8.3–10.6)
CHLORIDE BLD-SCNC: 108 MMOL/L (ref 99–110)
CLARITY: CLEAR
CO2: 25 MMOL/L (ref 21–32)
COLOR: YELLOW
CREAT SERPL-MCNC: 0.7 MG/DL (ref 0.6–1.2)
EOSINOPHILS ABSOLUTE: 0 K/UL (ref 0–0.6)
EOSINOPHILS RELATIVE PERCENT: 0.6 %
GFR AFRICAN AMERICAN: >60
GFR NON-AFRICAN AMERICAN: >60
GLUCOSE BLD-MCNC: 106 MG/DL (ref 70–99)
GLUCOSE URINE: NEGATIVE MG/DL
HCT VFR BLD CALC: 26.4 % (ref 36–48)
HEMOGLOBIN: 8.5 G/DL (ref 12–16)
KETONES, URINE: NEGATIVE MG/DL
LEUKOCYTE ESTERASE, URINE: NEGATIVE
LYMPHOCYTES ABSOLUTE: 0.9 K/UL (ref 1–5.1)
LYMPHOCYTES RELATIVE PERCENT: 14.9 %
MCH RBC QN AUTO: 34.3 PG (ref 26–34)
MCHC RBC AUTO-ENTMCNC: 32.3 G/DL (ref 31–36)
MCV RBC AUTO: 106 FL (ref 80–100)
MICROSCOPIC EXAMINATION: ABNORMAL
MONOCYTES ABSOLUTE: 0.4 K/UL (ref 0–1.3)
MONOCYTES RELATIVE PERCENT: 6.9 %
NEUTROPHILS ABSOLUTE: 4.5 K/UL (ref 1.7–7.7)
NEUTROPHILS RELATIVE PERCENT: 77.1 %
NITRITE, URINE: NEGATIVE
PDW BLD-RTO: 18.5 % (ref 12.4–15.4)
PH UA: 5.5 (ref 5–8)
PLATELET # BLD: 251 K/UL (ref 135–450)
PMV BLD AUTO: 7.7 FL (ref 5–10.5)
POTASSIUM SERPL-SCNC: 4.1 MMOL/L (ref 3.5–5.1)
PROTEIN UA: NEGATIVE MG/DL
RBC # BLD: 2.49 M/UL (ref 4–5.2)
REPORT: NORMAL
SARS-COV-2: NOT DETECTED
SODIUM BLD-SCNC: 143 MMOL/L (ref 136–145)
SPECIFIC GRAVITY UA: >=1.03 (ref 1–1.03)
THIS TEST SENT TO: NORMAL
URINE TYPE: ABNORMAL
UROBILINOGEN, URINE: 0.2 E.U./DL
WBC # BLD: 5.8 K/UL (ref 4–11)

## 2020-09-11 PROCEDURE — 36415 COLL VENOUS BLD VENIPUNCTURE: CPT

## 2020-09-11 PROCEDURE — 2580000003 HC RX 258: Performed by: FAMILY MEDICINE

## 2020-09-11 PROCEDURE — 6370000000 HC RX 637 (ALT 250 FOR IP): Performed by: NURSE PRACTITIONER

## 2020-09-11 PROCEDURE — 6370000000 HC RX 637 (ALT 250 FOR IP): Performed by: FAMILY MEDICINE

## 2020-09-11 PROCEDURE — 6360000002 HC RX W HCPCS: Performed by: FAMILY MEDICINE

## 2020-09-11 PROCEDURE — 96372 THER/PROPH/DIAG INJ SC/IM: CPT

## 2020-09-11 PROCEDURE — 81003 URINALYSIS AUTO W/O SCOPE: CPT

## 2020-09-11 PROCEDURE — G0378 HOSPITAL OBSERVATION PER HR: HCPCS

## 2020-09-11 PROCEDURE — 80048 BASIC METABOLIC PNL TOTAL CA: CPT

## 2020-09-11 PROCEDURE — 85025 COMPLETE CBC W/AUTO DIFF WBC: CPT

## 2020-09-11 PROCEDURE — 51701 INSERT BLADDER CATHETER: CPT

## 2020-09-11 RX ORDER — CHLORDIAZEPOXIDE HYDROCHLORIDE 25 MG/1
25 CAPSULE, GELATIN COATED ORAL NIGHTLY
Status: COMPLETED | OUTPATIENT
Start: 2020-09-11 | End: 2020-09-11

## 2020-09-11 RX ADMIN — FOLIC ACID 1 MG: 1 TABLET ORAL at 08:13

## 2020-09-11 RX ADMIN — SERTRALINE HYDROCHLORIDE 25 MG: 50 TABLET, FILM COATED ORAL at 08:12

## 2020-09-11 RX ADMIN — ATORVASTATIN CALCIUM 40 MG: 40 TABLET, FILM COATED ORAL at 08:13

## 2020-09-11 RX ADMIN — PANTOPRAZOLE SODIUM 40 MG: 40 TABLET, DELAYED RELEASE ORAL at 08:13

## 2020-09-11 RX ADMIN — ASPIRIN 81 MG: 81 TABLET ORAL at 08:13

## 2020-09-11 RX ADMIN — Medication 10 ML: at 08:13

## 2020-09-11 RX ADMIN — FERROUS SULFATE TAB 325 MG (65 MG ELEMENTAL FE) 325 MG: 325 (65 FE) TAB at 08:13

## 2020-09-11 RX ADMIN — Medication 1 TABLET: at 08:13

## 2020-09-11 RX ADMIN — ENOXAPARIN SODIUM 40 MG: 40 INJECTION SUBCUTANEOUS at 08:13

## 2020-09-11 RX ADMIN — Medication 10 ML: at 20:38

## 2020-09-11 RX ADMIN — CHLORDIAZEPOXIDE HYDROCHLORIDE 25 MG: 25 CAPSULE ORAL at 20:38

## 2020-09-11 RX ADMIN — Medication 10 ML: at 21:00

## 2020-09-11 RX ADMIN — GABAPENTIN 100 MG: 100 CAPSULE ORAL at 20:38

## 2020-09-11 RX ADMIN — Medication 100 MG: at 08:13

## 2020-09-11 ASSESSMENT — PAIN SCALES - GENERAL: PAINLEVEL_OUTOF10: 0

## 2020-09-11 NOTE — PROGRESS NOTES
sounds. Musculoskeletal: No clubbing, cyanosis or edema bilaterally. Full range of motion without deformity. Skin: Skin color, texture, turgor normal.  No rashes or lesions. Neurologic:  Neurovascularly intact without any focal sensory/motor deficits. Cranial nerves: II-XII intact, grossly non-focal.  Psychiatric: Alert and oriented, thought content appropriate, normal insight  Capillary Refill: Brisk,< 3 seconds   Peripheral Pulses: +2 palpable, equal bilaterally       Labs:   Recent Labs     09/11/20  1156   WBC 5.8   HGB 8.5*   HCT 26.4*        Recent Labs     09/09/20  0700 09/11/20  1156    143   K 3.9 4.1    108   CO2 23 25   BUN 12 14   CREATININE 0.7 0.7   CALCIUM 8.3 8.1*     No results for input(s): AST, ALT, BILIDIR, BILITOT, ALKPHOS in the last 72 hours. No results for input(s): INR in the last 72 hours. No results for input(s): [facility] in the last 72 hours. Urinalysis:      Lab Results   Component Value Date    NITRU Negative 09/11/2020    WBCUA 0-2 03/21/2020    BACTERIA 2+ 03/21/2020    RBCUA 0-2 03/21/2020    BLOODU Negative 09/11/2020    SPECGRAV >=1.030 09/11/2020    GLUCOSEU Negative 09/11/2020       Radiology:  MRI BRAIN WO CONTRAST   Final Result   1. No acute intracranial abnormality. No acute infarction. 2. Mild senescent changes with mild parenchymal volume loss and minimal   chronic microvascular ischemic changes. CT HEAD WO CONTRAST   Final Result   No acute intracranial abnormality. XR CHEST PORTABLE   Final Result   No acute process. 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  - CT head negative. CXR negative. UA pending.   - ECHO completed - EF 60% with grade I DD.    - PT/OT evaluation.  - MRI of brain negative.     Alcohol abuse - patient states she drinks 1-2 bottles of wine daily.  - CIWA protocol ordered. Interested in alcohol cessation. - completed Librium taper. Anemia, macrocytic - likely due to EtOH abuse.  - folate low at 1.01 - started on folic acid supplement. B12 normal at 504. HTN - BP soft. Hold home atenolol. Depression - continue home Zoloft. DVT Prophylaxis: Lovenox  Diet: DIET GENERAL;  Code Status: Full Code    PT/OT Eval Status: recommend IPU, but insurance denied.       Dispo - medically stable for DC - pending COVID 19 testing    NAHID Armstrong - CNP

## 2020-09-11 NOTE — CARE COORDINATION
Enrico called and left a VM with Sherri at the [facility] to check the status of the precert. Enrico notes that COVID test is not back yet. Enrico will follow and assist as able.

## 2020-09-12 PROCEDURE — 6360000002 HC RX W HCPCS: Performed by: FAMILY MEDICINE

## 2020-09-12 PROCEDURE — 97530 THERAPEUTIC ACTIVITIES: CPT

## 2020-09-12 PROCEDURE — 2580000003 HC RX 258: Performed by: FAMILY MEDICINE

## 2020-09-12 PROCEDURE — 6370000000 HC RX 637 (ALT 250 FOR IP): Performed by: NURSE PRACTITIONER

## 2020-09-12 PROCEDURE — 97535 SELF CARE MNGMENT TRAINING: CPT

## 2020-09-12 PROCEDURE — 6370000000 HC RX 637 (ALT 250 FOR IP): Performed by: FAMILY MEDICINE

## 2020-09-12 PROCEDURE — G0378 HOSPITAL OBSERVATION PER HR: HCPCS

## 2020-09-12 PROCEDURE — 96372 THER/PROPH/DIAG INJ SC/IM: CPT

## 2020-09-12 RX ORDER — LANOLIN ALCOHOL/MO/W.PET/CERES
100 CREAM (GRAM) TOPICAL DAILY
Qty: 30 TABLET | Refills: 3 | Status: SHIPPED | OUTPATIENT
Start: 2020-09-13

## 2020-09-12 RX ORDER — POLYETHYLENE GLYCOL 3350 17 G/17G
17 POWDER, FOR SOLUTION ORAL DAILY PRN
Qty: 527 G | Refills: 1 | Status: SHIPPED | OUTPATIENT
Start: 2020-09-12 | End: 2020-10-12

## 2020-09-12 RX ORDER — ASPIRIN 81 MG/1
81 TABLET ORAL DAILY
Qty: 30 TABLET | Refills: 3 | Status: SHIPPED | OUTPATIENT
Start: 2020-09-13

## 2020-09-12 RX ORDER — FOLIC ACID 1 MG/1
1 TABLET ORAL DAILY
Qty: 30 TABLET | Refills: 3 | Status: SHIPPED | OUTPATIENT
Start: 2020-09-13

## 2020-09-12 RX ORDER — GABAPENTIN 100 MG/1
100 CAPSULE ORAL NIGHTLY
Qty: 90 CAPSULE | Refills: 3 | Status: SHIPPED | OUTPATIENT
Start: 2020-09-12 | End: 2020-10-12

## 2020-09-12 RX ADMIN — Medication 10 ML: at 09:54

## 2020-09-12 RX ADMIN — ASPIRIN 81 MG: 81 TABLET ORAL at 09:53

## 2020-09-12 RX ADMIN — FOLIC ACID 1 MG: 1 TABLET ORAL at 09:53

## 2020-09-12 RX ADMIN — SERTRALINE HYDROCHLORIDE 25 MG: 50 TABLET, FILM COATED ORAL at 09:53

## 2020-09-12 RX ADMIN — Medication 1 TABLET: at 09:53

## 2020-09-12 RX ADMIN — Medication 100 MG: at 09:53

## 2020-09-12 RX ADMIN — ENOXAPARIN SODIUM 40 MG: 40 INJECTION SUBCUTANEOUS at 09:53

## 2020-09-12 RX ADMIN — FERROUS SULFATE TAB 325 MG (65 MG ELEMENTAL FE) 325 MG: 325 (65 FE) TAB at 09:53

## 2020-09-12 RX ADMIN — Medication 10 ML: at 21:30

## 2020-09-12 RX ADMIN — PANTOPRAZOLE SODIUM 40 MG: 40 TABLET, DELAYED RELEASE ORAL at 09:53

## 2020-09-12 RX ADMIN — GABAPENTIN 100 MG: 100 CAPSULE ORAL at 21:29

## 2020-09-12 RX ADMIN — ATORVASTATIN CALCIUM 40 MG: 40 TABLET, FILM COATED ORAL at 09:53

## 2020-09-12 ASSESSMENT — PAIN SCALES - GENERAL
PAINLEVEL_OUTOF10: 0

## 2020-09-12 NOTE — DISCHARGE INSTR - COC
Continuity of Care Form    Patient Name: Jesse Duarte   :  1950  MRN:  [id number]    Admit date:  2020  Discharge date:  20      Code Status Order: Full Code   Advance Directives:   [facility] Documentation       Date/Time Healthcare Directive Type of Healthcare Directive Copy in [facility] [address] Agent's Name Healthcare Agent's Phone Number    20 1301  No, patient does not have an advance directive for healthcare treatment -- -- -- -- --    20 2125  No, patient does not have an advance directive for healthcare treatment -- -- -- -- --            Admitting Physician:  Sahil Salgado MD  PCP: Anne Marie Archuleta MD    Discharging Nurse: [facility] Unit/Room#: 4058/5137-66  Discharging Unit Phone Number:     Emergency Contact:   Extended Emergency Contact Information  Primary Emergency Contact: Sameer Betancourt [facility]  Home Phone: [phone]  Mobile Phone: [phone]  Relation: Brother/Sister  Secondary Emergency Contact: rina marino  Home Phone: [phone]  Relation: Brother/Sister    Past Surgical History:  Past Surgical History:   Procedure Laterality Date    HYSTERECTOMY  1988    INTRACAPSULAR CATARACT EXTRACTION Left 2019    PHACOEMULSIFICATION OF CATARACT LEFT EYE WITH INTRAOCULAR LENS IMPLANT performed by Pat Maloney MD at [address] [place] [address] N/A 2019    EGD BIOPSY performed by Dorian Padron MD at [address] [place] [address] 3/23/2020    EGD DIAGNOSTIC ONLY performed by Dorian Padron MD at [address] [place] [address]       Immunization History: There is no immunization history on file for this patient.     Active Problems:  Patient Active Problem List   Diagnosis Code    Facet arthritis of lumbosacral region M47.817    DDD (degenerative disc disease), lumbar M51.36    SI joint arthritis M47.818    GI bleed K92.2    Obesity E66.9    Tobacco use Z72.0    High blood pressure I10    Hyperlipidemia E78.5    Anemia D64.9    GERD (gastroesophageal reflux disease) K21.9    ETOH abuse F10.10    Weakness R53.1       Isolation/Infection:   Isolation            No Isolation          Patient Infection Status       None to display            Nurse Assessment:  Last Vital Signs: BP (!) 164/78   Pulse 87   Temp 97.5 °F (36.4 °C) (Temporal)   Resp 16   Ht 5' 5\" (1.651 m)   Wt 180 lb (81.6 kg)   SpO2 94%   BMI 29.95 kg/m²     Last documented pain score (0-10 scale): Pain Level: 0  Last Weight:   Wt Readings from Last 1 Encounters:   09/06/20 180 lb (81.6 kg)     Mental Status:  oriented, alert and confused at times    IV Access:  - None    Nursing Mobility/ADLs:  Walking   Dependent  Transfer  Dependent  Bathing  Dependent  Dressing  Dependent  Toileting  Dependent  Feeding  Assisted  Med Admin  Assisted  Med Delivery   whole    Wound Care Documentation and Therapy:        Elimination:  Continence:   · Bowel: Yes  · Bladder: Yes  Urinary Catheter: None   Colostomy/Ileostomy/Ileal Conduit: No       Date of Last BM: 09/13    Intake/Output Summary (Last 24 hours) at 9/12/2020 1054  Last data filed at 9/12/2020 0515  Gross per 24 hour   Intake 270 ml   Output --   Net 270 ml     I/O last 3 completed shifts: In: 370 [P.O.:370]  Out: -     Safety Concerns:     History of Falls (last 30 days)    Impairments/Disabilities:      None    Nutrition Therapy:  Current Nutrition Therapy:   - Oral Diet:  General    Routes of Feeding: Oral  Liquids: No Restrictions  Daily Fluid Restriction: no  Last Modified Barium Swallow with Video (Video Swallowing Test): not done    Treatments at the Time of Hospital Discharge:   Respiratory Treatments: NA  Oxygen Therapy:  is not on home oxygen therapy.   Ventilator:    - No ventilator support    Rehab Therapies: Physical Therapy and Occupational Therapy  Weight Bearing Status/Restrictions: No weight bearing restirctions  Other Medical Equipment (for information only, NOT a DME order):  Stestormy  Other Treatments: NA    Patient's personal belongings (please select all that are sent with patient):  Glasses    RN SIGNATURE:  Electronically signed by Alfred Kelly RN on 9/13/20 at 2:26 PM EDT    CASE MANAGEMENT/SOCIAL WORK SECTION    Inpatient Status Date: 9/6/20    Readmission Risk Assessment Score:  Readmission Risk              Risk of Unplanned Readmission:        0           Discharging to Facility/ Agency   · Name: [facility]  · Address:  · Phone: [phone]  · Fax:    Dialysis Facility (if applicable)   · Name:  · Address:  · Dialysis Schedule:  · Phone:  · Fax:    / signature: Electronically signed by Deion Levi RN on 9/13/20 at 1:39 PM EDT    PHYSICIAN SECTION    Prognosis: Fair    Condition at Discharge: Stable    Rehab Potential (if transferring to Rehab): Guarded    Recommended Labs or Other Treatments After Discharge:   Recommended Follow-up, Labs or Other Treatments After Discharge:    PT/OT skilled nursing                Physician Certification: I certify the above information and transfer of Tonya Abreu  is necessary for the continuing treatment of the diagnosis listed and that she requires [place] for less 30 days.      Update Admission H&P: No change in H&P    PHYSICIAN SIGNATURE:  Electronically signed by NAHID Pérez CNP/Adalberto Cotton MD on 9/12/20 at 10:55 AM EDT

## 2020-09-12 NOTE — DISCHARGE SUMMARY
S1/S2 without murmurs, rubs or gallops. Abdomen: Soft, non-tender, non-distended with normal bowel sounds. Musculoskeletal:  No clubbing, cyanosis or edema bilaterally. Full range of motion without deformity. Poor msk control over torso   Skin: Skin color, texture, turgor normal.  No rashes or lesions. Neurologic:  Neurovascularly intact without any focal sensory/motor deficits. Cranial nerves: II-XII intact, grossly non-focal.  Psychiatric:  Alert and oriented, thought content appropriate, normal insight  Capillary Refill: Brisk,< 3 seconds   Peripheral Pulses: +2 palpable, equal bilaterally       Labs: For convenience and continuity at follow-up the following most recent labs are provided:      CBC:    Lab Results   Component Value Date    WBC 5.8 09/11/2020    HGB 8.5 09/11/2020    HCT 26.4 09/11/2020     09/11/2020       Renal:    Lab Results   Component Value Date     09/11/2020    K 4.1 09/11/2020    K 3.8 09/07/2020     09/11/2020    CO2 25 09/11/2020    BUN 14 09/11/2020    CREATININE 0.7 09/11/2020    CALCIUM 8.1 09/11/2020    PHOS 5.1 03/24/2020         Significant Diagnostic Studies    Radiology:   MRI BRAIN WO CONTRAST   Final Result   1. No acute intracranial abnormality. No acute infarction. 2. Mild senescent changes with mild parenchymal volume loss and minimal   chronic microvascular ischemic changes. CT HEAD WO CONTRAST   Final Result   No acute intracranial abnormality. XR CHEST PORTABLE   Final Result   No acute process.                 Consults:     IP CONSULT TO HOSPITALIST  IP CONSULT TO SOCIAL WORK  IP CONSULT TO SPIRITUAL SERVICES  IP CONSULT TO PHYSICAL MEDICINE REHAB    Disposition:  SNF/Rehab     Condition at Discharge: Stable    Discharge Instructions/Follow-up:  As needed     Code Status:  Full Code     Activity: activity as tolerated    Diet: regular diet      Discharge Medications:     Current Discharge Medication List           Details   aspirin 81 MG EC tablet Take 1 tablet by mouth daily  Qty: 30 tablet, Refills: 3      gabapentin (NEURONTIN) 100 MG capsule Take 1 capsule by mouth nightly for 30 days. Qty: 90 capsule, Refills: 3      folic acid (FOLVITE) 1 MG tablet Take 1 tablet by mouth daily  Qty: 30 tablet, Refills: 3      polyethylene glycol (GLYCOLAX) 17 g packet Take 17 g by mouth daily as needed for Constipation  Qty: 527 g, Refills: 1      vitamin B-1 100 MG tablet Take 1 tablet by mouth daily  Qty: 30 tablet, Refills: 3              Details   atenolol (TENORMIN) 25 MG tablet Take 25 mg by mouth daily      ferrous sulfate (IRON 325) 325 (65 Fe) MG tablet Take 325 mg by mouth daily      Multiple Vitamin (MULTIVITAMIN) TABS tablet Take 1 tablet by mouth daily      sertraline (ZOLOFT) 25 MG tablet Take 25 mg by mouth daily      pantoprazole (PROTONIX) 40 MG tablet Take 40 mg by mouth daily      atorvastatin (LIPITOR) 40 MG tablet TAKE 1 TABLET BY MOUTH DAILY. Refills: 4             Time Spent on discharge is more than 30 minutes in the examination, evaluation, counseling and review of medications and discharge plan. Signed:    NAHID Leon - CNP   9/12/2020      Thank you Rober Rebolledo MD for the opportunity to be involved in this patient's care. If you have any questions or concerns please feel free to contact me at [phone].

## 2020-09-12 NOTE — PROGRESS NOTES
Occupational Therapy  Facility/Department: [facility] B3 - MED SURG  Daily Treatment Note  NAME: Kath Sauceda  : 1950  MRN: [id number]    Date of Service: 2020    Discharge Recommendations:  Subacute/Skilled Nursing Facility       Assessment   Performance deficits / Impairments: Decreased ADL status; Decreased endurance;Decreased balance;Decreased posture;Decreased strength;Decreased cognition;Decreased safe awareness  Assessment: Pt agreeable to static sitting balance EOB for core and strength training this date. She was dependent with LB ADLs, bed mobility from supine <> EOB and EOB<> supine. Static sitting EOB was max A d/t poor trunk control noted. Tolerated up to 5-6 min with X2 LOB posteriorly. She required max verbal and tactile cues for all completion of tasks d/t impaired sitting balance, decreased cognition, and some learned helplessness noted. Requires cont skilled OT services at a SNF in order to return to PLOF safely and indep as possible. Cont POC. Prognosis: Good  OT Education: OT Role;Plan of Care;ADL Adaptive Strategies;Transfer Training;Equipment  REQUIRES OT FOLLOW UP: Yes  Activity Tolerance  Activity Tolerance: Patient limited by fatigue  Activity Tolerance: /83 seated upright at bedside (at rest); /73  during static sitting balance EOB  Safety Devices  Safety Devices in place: Yes  Type of devices: Left in bed;Bed alarm in place;Call light within reach;Gait belt;Nurse notified         Patient Diagnosis(es): The primary encounter diagnosis was Other fatigue. A diagnosis of Acute alcoholic intoxication without complication (HCC) was also pertinent to this visit. has a past medical history of Arthritis, Cancer ([address] COPD (chronic obstructive pulmonary disease) ([facility] [address] Fatty liver, Fractures, Gastric ulcer, High blood pressure, Hyperlipidemia, and Palpitations. has a past surgical history that includes Hysterectomy (); Tubal ligation ();  Skin cancer excision; Tonsillectomy and adenoidectomy; Intracapsular cataract extraction (Left, 2019); Upper gastrointestinal endoscopy (N/A, 2019); and Upper gastrointestinal endoscopy (N/A, 3/23/2020). Restrictions  Restrictions/Precautions  Restrictions/Precautions: Up as Tolerated, Seizure, Fall Risk  Position Activity Restriction  Other position/activity restrictions: Telemetry, up with assist  Subjective   General  Chart Reviewed: Yes  Patient assessed for rehabilitation services?: Yes  Subjective  Subjective: pt found alert and awake at bedside upon arrival. reports hx of multiple falls, \"I get too dizzy at times. \"  General Comment  Comments: RN approved therapy  Vital Signs  Patient Currently in Pain: Denies   Orientation  Orientation  Overall Orientation Status: Within Functional Limits  Objective    ADL  Feeding: Setup; Beverage management(to cut food; encouraged sitting on EOB but pt refused and insisted sitting-up at bedside instead)  LE Dressing: Dependent/Total(at bedside for adult briefs)  Toileting: Dependent/Total(bed pan)           Bed mobility  Rolling to Left: 2 Person assistance;Dependent/Total  Rolling to Right: 2 Person assistance;Dependent/Total  Supine to Sit: 2 Person assistance;Dependent/Total  Sit to Supine: 2 Person assistance;Dependent/Total  Scootin Person assistance;Dependent/Total         Cognition  Overall Cognitive Status: Exceptions  Arousal/Alertness: Appropriate responses to stimuli  Following Commands: Follows one step commands with increased time; Follows one step commands with repetition  Attention Span: Attends with cues to redirect  Memory: Appears intact  Safety Judgement: Decreased awareness of need for assistance;Decreased awareness of need for safety  Problem Solving: Assistance required to generate solutions  Insights: Decreased awareness of deficits  Initiation: Requires cues for some  Sequencing: Requires cues for some        Plan   Plan  Times per week: 3-5x per week  Times per day: Daily  Current Treatment Recommendations: Strengthening, Balance Training, Functional Mobility Training, Safety Education & Training, Self-Care / ADL, Equipment Evaluation, Education, & procurement    AM-PAC Score        AM-PAC Inpatient Daily Activity Raw Score: 10 (09/12/20 1046)  AM-PAC Inpatient ADL T-Scale Score : 27.31 (09/12/20 1046)  ADL Inpatient CMS 0-100% Score: 74.7 (09/12/20 1046)  ADL Inpatient CMS G-Code Modifier : CL (09/12/20 1046)    Goals  Short term goals  Time Frame for Short term goals: one week unless otherwise specified 9/14  Short term goal 1: Patient will complete toilet transfer with CGA and use of RW. Short term goal 2: Patient will complete standing grooming tasks with CGA. Short term goal 3: Patient will complete LB dressing with supervision. Short term goal 4: Patient will complete bathing assessment and upgrade.   Patient Goals   Patient goals : \"to get stronger\"       Therapy Time   Individual Concurrent Group Co-treatment   Time In 8709         Time Out 1055         Minutes 60         Timed Code Treatment Minutes: [address] [place] [address]

## 2020-09-12 NOTE — CARE COORDINATION
Aware of d/c order. Called and left vm inquiring about status of pre cert. Covid neg 9/11. Addendum: Pre cert remains pending.

## 2020-09-13 VITALS
HEIGHT: 65 IN | SYSTOLIC BLOOD PRESSURE: 112 MMHG | BODY MASS INDEX: 29.99 KG/M2 | WEIGHT: 180 LBS | HEART RATE: 107 BPM | DIASTOLIC BLOOD PRESSURE: 67 MMHG | RESPIRATION RATE: 16 BRPM | TEMPERATURE: 98.3 F | OXYGEN SATURATION: 97 %

## 2020-09-13 PROCEDURE — 2700000000 HC OXYGEN THERAPY PER DAY

## 2020-09-13 PROCEDURE — 2580000003 HC RX 258: Performed by: FAMILY MEDICINE

## 2020-09-13 PROCEDURE — 96372 THER/PROPH/DIAG INJ SC/IM: CPT

## 2020-09-13 PROCEDURE — G0378 HOSPITAL OBSERVATION PER HR: HCPCS

## 2020-09-13 PROCEDURE — 6370000000 HC RX 637 (ALT 250 FOR IP): Performed by: FAMILY MEDICINE

## 2020-09-13 PROCEDURE — 6370000000 HC RX 637 (ALT 250 FOR IP): Performed by: NURSE PRACTITIONER

## 2020-09-13 PROCEDURE — 6360000002 HC RX W HCPCS: Performed by: FAMILY MEDICINE

## 2020-09-13 PROCEDURE — 94761 N-INVAS EAR/PLS OXIMETRY MLT: CPT

## 2020-09-13 RX ADMIN — ENOXAPARIN SODIUM 40 MG: 40 INJECTION SUBCUTANEOUS at 09:03

## 2020-09-13 RX ADMIN — ATORVASTATIN CALCIUM 40 MG: 40 TABLET, FILM COATED ORAL at 09:03

## 2020-09-13 RX ADMIN — Medication 1 TABLET: at 09:03

## 2020-09-13 RX ADMIN — PANTOPRAZOLE SODIUM 40 MG: 40 TABLET, DELAYED RELEASE ORAL at 09:03

## 2020-09-13 RX ADMIN — SERTRALINE HYDROCHLORIDE 25 MG: 50 TABLET, FILM COATED ORAL at 09:03

## 2020-09-13 RX ADMIN — Medication 10 ML: at 09:05

## 2020-09-13 RX ADMIN — FERROUS SULFATE TAB 325 MG (65 MG ELEMENTAL FE) 325 MG: 325 (65 FE) TAB at 09:03

## 2020-09-13 RX ADMIN — FOLIC ACID 1 MG: 1 TABLET ORAL at 09:03

## 2020-09-13 RX ADMIN — Medication 100 MG: at 09:03

## 2020-09-13 RX ADMIN — ASPIRIN 81 MG: 81 TABLET ORAL at 09:03

## 2020-09-13 ASSESSMENT — PAIN SCALES - GENERAL
PAINLEVEL_OUTOF10: 0
PAINLEVEL_OUTOF10: 0

## 2020-09-13 NOTE — DISCHARGE SUMMARY
Pt was not dc on order date as was awaiting precet and transport arrangements      Patient ID: Poornima Bae      Patient's PCP: Lamar Ro MD    Admit Date: 9/6/2020     Discharge Date:   9/12/2020    Admitting Physician: Cha Smiley MD     Discharge Physician: NAHID Gloria - CNP     Discharge Diagnoses: Active Hospital Problems    Diagnosis    Weakness [R53.1]       The patient was seen and examined on day of discharge and this discharge summary is in conjunction with any daily progress note from day of discharge. Hospital Course:      77year old female with a PMH of alcohol abuse, HTN, and GERD, who presented to [facility] with complaints of weakness and frequent falls. 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  - CT head negative. CXR negative. UA bland  - ECHO completed - EF 60% with grade I DD.    - PT/OT evaluation.  - MRI of brain negative.     Alcohol abuse - patient states she drinks 1-2 bottles of wine daily.  - CIWA protocol ordered. Interested in alcohol cessation. - completed Librium taper.     Anemia, macrocytic - likely due to EtOH abuse.  - folate low at 0.56 - started on folic acid supplement. B12 normal at 504.     HTN - BP soft. Hold home atenolol.       Depression - continue home Zoloft. Physical Exam Performed:     BP 93/65   Pulse 107   Temp 98.3 °F (36.8 °C) (Oral)   Resp 16   Ht 5' 5\" (1.651 m)   Wt 180 lb (81.6 kg)   SpO2 100%   BMI 29.95 kg/m²       General appearance:  No apparent distress, appears stated age and cooperative. HEENT:  Normal cephalic, atraumatic without obvious deformity. Pupils equal, round, and reactive to light. Extra ocular muscles intact. Conjunctivae/corneas clear. Neck: Supple, with full range of motion. No jugular venous distention. Trachea midline. Respiratory:  Normal respiratory effort.  Clear to auscultation, bilaterally without Rales/Wheezes/Rhonchi. Cardiovascular:  Regular rate and rhythm with normal S1/S2 without murmurs, rubs or gallops. Abdomen: Soft, non-tender, non-distended with normal bowel sounds. Musculoskeletal:  No clubbing, cyanosis or edema bilaterally. Full range of motion without deformity. Poor msk control over torso   Skin: Skin color, texture, turgor normal.  No rashes or lesions. Neurologic:  Neurovascularly intact without any focal sensory/motor deficits. Cranial nerves: II-XII intact, grossly non-focal.  Psychiatric:  Alert and oriented, thought content appropriate, normal insight  Capillary Refill: Brisk,< 3 seconds   Peripheral Pulses: +2 palpable, equal bilaterally       Labs: For convenience and continuity at follow-up the following most recent labs are provided:      CBC:    Lab Results   Component Value Date    WBC 5.8 09/11/2020    HGB 8.5 09/11/2020    HCT 26.4 09/11/2020     09/11/2020       Renal:    Lab Results   Component Value Date     09/11/2020    K 4.1 09/11/2020    K 3.8 09/07/2020     09/11/2020    CO2 25 09/11/2020    BUN 14 09/11/2020    CREATININE 0.7 09/11/2020    CALCIUM 8.1 09/11/2020    PHOS 5.1 03/24/2020         Significant Diagnostic Studies    Radiology:   MRI BRAIN WO CONTRAST   Final Result   1. No acute intracranial abnormality. No acute infarction. 2. Mild senescent changes with mild parenchymal volume loss and minimal   chronic microvascular ischemic changes. CT HEAD WO CONTRAST   Final Result   No acute intracranial abnormality. XR CHEST PORTABLE   Final Result   No acute process.                 Consults:     IP CONSULT TO HOSPITALIST  IP CONSULT TO SOCIAL WORK  IP CONSULT TO SPIRITUAL SERVICES  IP CONSULT TO PHYSICAL MEDICINE REHAB    Disposition:  SNF/Rehab     Condition at Discharge: Stable    Discharge Instructions/Follow-up:  As needed     Code Status:  Full Code     Activity: activity as tolerated    Diet: regular diet      Discharge

## 2020-09-13 NOTE — CARE COORDINATION
Message from [employer] [facility] Department)[phone]  inquiring as to discharge plans - patient's plan return home or facility. They were not able to obtain access to the home. This writer LM to let him know patient will not be returning home at this time and is being discharged to a facility.     Isi Sanon RN

## 2021-04-20 ENCOUNTER — HOSPITAL ENCOUNTER (OUTPATIENT)
Dept: MAMMOGRAPHY | Age: 71
Discharge: HOME | End: 2021-04-25
Payer: MEDICARE

## 2021-04-20 VITALS — BODY MASS INDEX: 27.32 KG/M2 | HEIGHT: 66 IN | WEIGHT: 170 LBS

## 2021-04-20 DIAGNOSIS — Z12.31 VISIT FOR SCREENING MAMMOGRAM: ICD-10-CM

## 2021-04-20 PROCEDURE — 77063 BREAST TOMOSYNTHESIS BI: CPT

## 2022-04-21 ENCOUNTER — HOSPITAL ENCOUNTER (OUTPATIENT)
Dept: MAMMOGRAPHY | Age: 72
Discharge: HOME | End: 2022-04-26
Payer: MEDICARE

## 2022-04-21 VITALS — WEIGHT: 170 LBS | BODY MASS INDEX: 28.32 KG/M2 | HEIGHT: 65 IN

## 2022-04-21 DIAGNOSIS — Z12.31 VISIT FOR SCREENING MAMMOGRAM: ICD-10-CM

## 2022-04-21 PROCEDURE — 77063 BREAST TOMOSYNTHESIS BI: CPT

## 2024-12-10 NOTE — PLAN OF CARE
Problem: Falls - Risk of:  Goal: Will remain free from falls  Description: Will remain free from falls  Outcome: Ongoing  Note: Patient encouraged to call out when needing assistance. Bed locked and in the lowest position. Bedside table, pt belongings, along with call light within reach. Verbal understanding from pt. good minus

## (undated) DEVICE — Device: Brand: DEFENDO VALVE AND CONNECTOR KIT

## (undated) DEVICE — SURGICAL PROCEDURE PACK EYE ANDRSN

## (undated) DEVICE — SOLUTION IV 1000ML LAC RINGERS PH 6.5 INJ USP VIAFLX PLAS

## (undated) DEVICE — ENDO CARRY-ON PROCEDURE KIT INCLUDES SUCTION TUBING, LUBRICANT, GAUZE, BIOHAZARD STICKER, TRANSPORT PAD AND INTERCEPT BEDSIDE KIT.: Brand: ENDO CARRY-ON PROCEDURE KIT

## (undated) DEVICE — SET ADMIN PRIMING 7ML L30IN 7.35LB 20 GTT 2ND RLER CLMP

## (undated) DEVICE — GLOVE,SURG,SENSICARE,ALOE,LF,PF,7: Brand: MEDLINE

## (undated) DEVICE — KIT INF CTRL 2OZ LUB TBNG L12FT DBL END BRSH SYR OP4

## (undated) DEVICE — PATIENT CARE KIT EYE POST OP

## (undated) DEVICE — SET GRAV VENT NVENT CK VLV 3 NDL FREE PRT 10 GTT

## (undated) DEVICE — CONMED SCOPE SAVER BITE BLOCK, 20X27 MM: Brand: SCOPE SAVER

## (undated) DEVICE — FORCEPS BX L240CM DIA2.4MM L NDL RAD JAW 4 133340

## (undated) DEVICE — 3M™ TEGADERM™ TRANSPARENT FILM DRESSING FRAME STYLE, 1624W, 2-3/8 IN X 2-3/4 IN (6 CM X 7 CM), 100/CT 4CT/CASE: Brand: 3M™ TEGADERM™

## (undated) DEVICE — GOWN,SIRUS,NON REINFRCD,LARGE,SET IN SL: Brand: MEDLINE

## (undated) DEVICE — ELECTRODE ECG MONITR FOAM TEAR DROP ADLT RED

## (undated) DEVICE — SOLUTION IRRIG 250ML STRL H2O PLAS POUR BTL USP

## (undated) DEVICE — TOWEL,OR,DSP,ST,BLUE,STD,4/PK,20PK/CS: Brand: MEDLINE

## (undated) DEVICE — KENDALL 500 SERIES DIAPHORETIC FOAM MONITORING ELECTRODE - TEAR DROP SHAPE ( 5/PK): Brand: KENDALL

## (undated) DEVICE — CATHETER IV 20GA L1.25IN PNK FEP SFTY STR HUB RADPQ DISP

## (undated) DEVICE — SURGICAL PROCEDURE PACK PPK8711] ALCON LABORATORIES INC]

## (undated) DEVICE — AIRLIFE™ NASAL OXYGEN CANNULA CURVED, FLARED TIP, WITH 7 FEET (2.1 M) CRUSH RESISTANT TUBING, OVER-THE-EAR STYLE: Brand: AIRLIFE™

## (undated) DEVICE — KIT HUMD 350ML NSL CANN HI FLOW STRT 7FT O2 TBNG AD CURAPLEX

## (undated) DEVICE — Z DISCONTINUED USE 2276105 GOWN PROTCT UNIV CHST W28IN L49IN SL 24IN BLU SPUNBOND FLM

## (undated) DEVICE — SILICONE I/A TIP STRAIGHT: Brand: ALCON

## (undated) DEVICE — GOWN IMPERV UNIV BLU PLAS FLM PERF OPN BK W THUMBHOOKS

## (undated) DEVICE — 3M™ STERI-DRAPE™ INCISE DRAPE 1040 (35CM X 35CM): Brand: STERI-DRAPE™